# Patient Record
Sex: MALE | Race: AMERICAN INDIAN OR ALASKA NATIVE
[De-identification: names, ages, dates, MRNs, and addresses within clinical notes are randomized per-mention and may not be internally consistent; named-entity substitution may affect disease eponyms.]

---

## 2017-01-04 ENCOUNTER — PRIOR ORIGINAL RECORDS (OUTPATIENT)
Dept: OTHER | Age: 77
End: 2017-01-04

## 2017-01-11 ENCOUNTER — PRIOR ORIGINAL RECORDS (OUTPATIENT)
Dept: OTHER | Age: 77
End: 2017-01-11

## 2017-01-12 ENCOUNTER — PRIOR ORIGINAL RECORDS (OUTPATIENT)
Dept: OTHER | Age: 77
End: 2017-01-12

## 2017-02-10 ENCOUNTER — LAB REQUISITION (OUTPATIENT)
Dept: LAB | Facility: HOSPITAL | Age: 77
End: 2017-02-10
Payer: COMMERCIAL

## 2017-02-10 DIAGNOSIS — E78.00 PURE HYPERCHOLESTEROLEMIA: ICD-10-CM

## 2017-02-10 DIAGNOSIS — E11.9 TYPE 2 DIABETES MELLITUS WITHOUT COMPLICATIONS (HCC): ICD-10-CM

## 2017-02-10 LAB
ALT SERPL-CCNC: 23 U/L (ref 17–63)
ANION GAP SERPL CALC-SCNC: 12 MMOL/L (ref 0–18)
BASOPHILS # BLD: 0 K/UL (ref 0–0.2)
BASOPHILS NFR BLD: 1 %
BUN SERPL-MCNC: 15 MG/DL (ref 8–20)
BUN/CREAT SERPL: 13.6 (ref 10–20)
CALCIUM SERPL-MCNC: 9.3 MG/DL (ref 8.5–10.5)
CHLORIDE SERPL-SCNC: 105 MMOL/L (ref 95–110)
CHOLEST SERPL-MCNC: 119 MG/DL (ref 110–200)
CO2 SERPL-SCNC: 23 MMOL/L (ref 22–32)
CREAT SERPL-MCNC: 1.1 MG/DL (ref 0.5–1.5)
EOSINOPHIL # BLD: 0.4 K/UL (ref 0–0.7)
EOSINOPHIL NFR BLD: 6 %
ERYTHROCYTE [DISTWIDTH] IN BLOOD BY AUTOMATED COUNT: 14.8 % (ref 11–15)
GLUCOSE SERPL-MCNC: 130 MG/DL (ref 70–99)
HBA1C MFR BLD: 6 % (ref 4–6)
HCT VFR BLD AUTO: 32.2 % (ref 41–52)
HDLC SERPL-MCNC: 38 MG/DL
HGB BLD-MCNC: 10.6 G/DL (ref 13.5–17.5)
LDLC SERPL CALC-MCNC: 44 MG/DL (ref 0–99)
LYMPHOCYTES # BLD: 1.5 K/UL (ref 1–4)
LYMPHOCYTES NFR BLD: 21 %
MCH RBC QN AUTO: 30.2 PG (ref 27–32)
MCHC RBC AUTO-ENTMCNC: 33 G/DL (ref 32–37)
MCV RBC AUTO: 91.4 FL (ref 80–100)
MONOCYTES # BLD: 0.6 K/UL (ref 0–1)
MONOCYTES NFR BLD: 9 %
NEUTROPHILS # BLD AUTO: 4.4 K/UL (ref 1.8–7.7)
NEUTROPHILS NFR BLD: 64 %
NONHDLC SERPL-MCNC: 81 MG/DL
OSMOLALITY UR CALC.SUM OF ELEC: 293 MOSM/KG (ref 275–295)
PLATELET # BLD AUTO: 110 K/UL (ref 140–400)
PMV BLD AUTO: 8.2 FL (ref 7.4–10.3)
POTASSIUM SERPL-SCNC: 4 MMOL/L (ref 3.3–5.1)
RBC # BLD AUTO: 3.52 M/UL (ref 4.5–5.9)
SODIUM SERPL-SCNC: 140 MMOL/L (ref 136–144)
TRIGL SERPL-MCNC: 184 MG/DL (ref 1–149)
WBC # BLD AUTO: 7 K/UL (ref 4–11)

## 2017-02-10 PROCEDURE — 80061 LIPID PANEL: CPT | Performed by: INTERNAL MEDICINE

## 2017-02-10 PROCEDURE — 84460 ALANINE AMINO (ALT) (SGPT): CPT | Performed by: INTERNAL MEDICINE

## 2017-02-10 PROCEDURE — 85025 COMPLETE CBC W/AUTO DIFF WBC: CPT | Performed by: INTERNAL MEDICINE

## 2017-02-10 PROCEDURE — 80048 BASIC METABOLIC PNL TOTAL CA: CPT | Performed by: INTERNAL MEDICINE

## 2017-02-10 PROCEDURE — 83036 HEMOGLOBIN GLYCOSYLATED A1C: CPT | Performed by: INTERNAL MEDICINE

## 2017-02-23 ENCOUNTER — HOSPITAL ENCOUNTER (OUTPATIENT)
Dept: GENERAL RADIOLOGY | Age: 77
Discharge: HOME | End: 2017-02-23
Attending: ORTHOPAEDIC SURGERY
Payer: COMMERCIAL

## 2017-02-23 DIAGNOSIS — M25.571 ANKLE PAIN, RIGHT: ICD-10-CM

## 2017-02-23 DIAGNOSIS — M25.471 ANKLE SWELLING, RIGHT: ICD-10-CM

## 2017-02-23 PROCEDURE — 73610 X-RAY EXAM OF ANKLE: CPT

## 2017-05-05 ENCOUNTER — PRIOR ORIGINAL RECORDS (OUTPATIENT)
Dept: OTHER | Age: 77
End: 2017-05-05

## 2017-07-12 ENCOUNTER — PRIOR ORIGINAL RECORDS (OUTPATIENT)
Dept: OTHER | Age: 77
End: 2017-07-12

## 2017-07-13 LAB
ALT (SGPT): 26 U/L
BUN: 19 MG/DL
CALCIUM: 9.2 MG/DL
CHLORIDE: 110 MEQ/L
CHOLESTEROL, TOTAL: 118 MG/DL
CREATININE, SERUM: 1.17 MG/DL
GLUCOSE: 157 MG/DL
GLUCOSE: 157 MG/DL
HDL CHOLESTEROL: 42 MG/DL
LDL CHOLESTEROL: 51 MG/DL
POTASSIUM, SERUM: 4.1 MEQ/L
SGPT (ALT): 26 IU/L
SODIUM: 142 MEQ/L
TRIGLYCERIDES: 125 MG/DL

## 2017-08-31 PROBLEM — E11.51 DIABETES MELLITUS TYPE 2 WITH ATHEROSCLEROSIS OF ARTERIES OF EXTREMITIES (HCC): Status: ACTIVE | Noted: 2017-08-31

## 2017-08-31 PROBLEM — I70.209 DIABETES MELLITUS TYPE 2 WITH ATHEROSCLEROSIS OF ARTERIES OF EXTREMITIES (HCC): Status: ACTIVE | Noted: 2017-08-31

## 2017-08-31 PROBLEM — I73.9 ASYMPTOMATIC PVD (PERIPHERAL VASCULAR DISEASE) (HCC): Status: ACTIVE | Noted: 2017-08-31

## 2017-08-31 PROBLEM — I10 HYPERTENSION: Status: ACTIVE | Noted: 2017-08-31

## 2017-08-31 PROBLEM — E07.9 THYROID DISEASE: Status: ACTIVE | Noted: 2017-08-31

## 2017-08-31 PROBLEM — I25.10 CAD (CORONARY ARTERY DISEASE): Status: ACTIVE | Noted: 2017-08-31

## 2017-08-31 PROBLEM — E78.5 HYPERLIPIDEMIA: Status: ACTIVE | Noted: 2017-08-31

## 2017-09-12 ENCOUNTER — LAB REQUISITION (OUTPATIENT)
Dept: LAB | Facility: HOSPITAL | Age: 77
End: 2017-09-12
Payer: COMMERCIAL

## 2017-09-12 DIAGNOSIS — E11.9 TYPE 2 DIABETES MELLITUS WITHOUT COMPLICATIONS (HCC): ICD-10-CM

## 2017-09-12 DIAGNOSIS — E78.00 PURE HYPERCHOLESTEROLEMIA: ICD-10-CM

## 2017-09-12 LAB
ALT SERPL-CCNC: 17 U/L (ref 17–63)
ANION GAP SERPL CALC-SCNC: 6 MMOL/L (ref 0–18)
BASOPHILS # BLD: 0 K/UL (ref 0–0.2)
BASOPHILS NFR BLD: 1 %
BUN SERPL-MCNC: 21 MG/DL (ref 8–20)
BUN/CREAT SERPL: 15 (ref 10–20)
CALCIUM SERPL-MCNC: 9.1 MG/DL (ref 8.5–10.5)
CHLORIDE SERPL-SCNC: 109 MMOL/L (ref 95–110)
CHOLEST SERPL-MCNC: 140 MG/DL (ref 110–200)
CO2 SERPL-SCNC: 23 MMOL/L (ref 22–32)
CREAT SERPL-MCNC: 1.4 MG/DL (ref 0.5–1.5)
EOSINOPHIL # BLD: 0.3 K/UL (ref 0–0.7)
EOSINOPHIL NFR BLD: 5 %
ERYTHROCYTE [DISTWIDTH] IN BLOOD BY AUTOMATED COUNT: 16.3 % (ref 11–15)
GLUCOSE SERPL-MCNC: 178 MG/DL (ref 70–99)
HCT VFR BLD AUTO: 33.4 % (ref 41–52)
HDLC SERPL-MCNC: 46 MG/DL
HGB BLD-MCNC: 11.1 G/DL (ref 13.5–17.5)
LDLC SERPL CALC-MCNC: 62 MG/DL (ref 0–99)
LYMPHOCYTES # BLD: 1.4 K/UL (ref 1–4)
LYMPHOCYTES NFR BLD: 24 %
MCH RBC QN AUTO: 30.4 PG (ref 27–32)
MCHC RBC AUTO-ENTMCNC: 33.2 G/DL (ref 32–37)
MCV RBC AUTO: 91.8 FL (ref 80–100)
MONOCYTES # BLD: 0.5 K/UL (ref 0–1)
MONOCYTES NFR BLD: 7 %
NEUTROPHILS # BLD AUTO: 3.8 K/UL (ref 1.8–7.7)
NEUTROPHILS NFR BLD: 63 %
NONHDLC SERPL-MCNC: 94 MG/DL
OSMOLALITY UR CALC.SUM OF ELEC: 293 MOSM/KG (ref 275–295)
PLATELET # BLD AUTO: 116 K/UL (ref 140–400)
PMV BLD AUTO: 8.3 FL (ref 7.4–10.3)
POTASSIUM SERPL-SCNC: 4.7 MMOL/L (ref 3.3–5.1)
RBC # BLD AUTO: 3.64 M/UL (ref 4.5–5.9)
SODIUM SERPL-SCNC: 138 MMOL/L (ref 136–144)
TRIGL SERPL-MCNC: 159 MG/DL (ref 1–149)
TSH SERPL-ACNC: 0.3 UIU/ML (ref 0.45–5.33)
WBC # BLD AUTO: 6.1 K/UL (ref 4–11)

## 2017-09-12 PROCEDURE — 85025 COMPLETE CBC W/AUTO DIFF WBC: CPT | Performed by: INTERNAL MEDICINE

## 2017-09-12 PROCEDURE — 80048 BASIC METABOLIC PNL TOTAL CA: CPT | Performed by: INTERNAL MEDICINE

## 2017-09-12 PROCEDURE — 83036 HEMOGLOBIN GLYCOSYLATED A1C: CPT | Performed by: INTERNAL MEDICINE

## 2017-09-12 PROCEDURE — 80061 LIPID PANEL: CPT | Performed by: INTERNAL MEDICINE

## 2017-09-12 PROCEDURE — 84460 ALANINE AMINO (ALT) (SGPT): CPT | Performed by: INTERNAL MEDICINE

## 2017-09-12 PROCEDURE — 84443 ASSAY THYROID STIM HORMONE: CPT | Performed by: INTERNAL MEDICINE

## 2017-09-13 LAB — HBA1C MFR BLD: 6.7 % (ref 4–6)

## 2018-01-06 ENCOUNTER — APPOINTMENT (OUTPATIENT)
Dept: GENERAL RADIOLOGY | Facility: HOSPITAL | Age: 78
End: 2018-01-06
Attending: EMERGENCY MEDICINE
Payer: COMMERCIAL

## 2018-01-06 ENCOUNTER — HOSPITAL ENCOUNTER (EMERGENCY)
Facility: HOSPITAL | Age: 78
Discharge: HOME | End: 2018-01-06
Attending: EMERGENCY MEDICINE
Payer: COMMERCIAL

## 2018-01-06 VITALS
TEMPERATURE: 98 F | BODY MASS INDEX: 34 KG/M2 | DIASTOLIC BLOOD PRESSURE: 70 MMHG | RESPIRATION RATE: 20 BRPM | HEART RATE: 75 BPM | OXYGEN SATURATION: 96 % | SYSTOLIC BLOOD PRESSURE: 165 MMHG | WEIGHT: 249 LBS

## 2018-01-06 DIAGNOSIS — M77.50 TENDONITIS OF ANKLE: Primary | ICD-10-CM

## 2018-01-06 PROCEDURE — 73610 X-RAY EXAM OF ANKLE: CPT | Performed by: EMERGENCY MEDICINE

## 2018-01-06 PROCEDURE — 99283 EMERGENCY DEPT VISIT LOW MDM: CPT

## 2018-01-06 PROCEDURE — 73630 X-RAY EXAM OF FOOT: CPT | Performed by: EMERGENCY MEDICINE

## 2018-01-06 RX ORDER — HYDROCODONE BITARTRATE AND ACETAMINOPHEN 5; 325 MG/1; MG/1
1-2 TABLET ORAL EVERY 8 HOURS PRN
Qty: 14 TABLET | Refills: 0 | Status: SHIPPED | OUTPATIENT
Start: 2018-01-06 | End: 2018-01-13

## 2018-01-06 RX ORDER — HYDROCODONE BITARTRATE AND ACETAMINOPHEN 5; 325 MG/1; MG/1
1 TABLET ORAL ONCE
Status: COMPLETED | OUTPATIENT
Start: 2018-01-06 | End: 2018-01-06

## 2018-01-06 NOTE — ED INITIAL ASSESSMENT (HPI)
Pt c/o worsening right foot pain over the past couple days. Pt has hx of injury to the foot approx 20 years ago where he broke it in 2 places, pt wears a brace to the foot because it did not heal properly. Denies any recent injury or falls.  Pt c/o pain j

## 2018-01-07 NOTE — ED PROVIDER NOTES
Patient Seen in: [facility] AND [facility] Emergency Department    History   Patient presents with: Foot Pain    Stated Complaint: right foot pain/injury     HPI    HPI: Laquita Headings is a 76year old male who presents with pain to r ankle and foot.   Has history o Alcohol use: No               Comment: none      Review of Systems    Positive for stated complaint: right foot pain/injury   Other systems are as noted in HPI. Constitutional and vital signs reviewed.       All other systems reviewed fu with ortho.   Given crutches declines cast.  rec walking boot        Disposition and Plan     Clinical Impression:  Tendonitis of ankle  (primary encounter diagnosis)    Disposition:  Discharge    Follow-up:  MD Kelly Kim 110

## 2018-01-09 ENCOUNTER — LAB REQUISITION (OUTPATIENT)
Dept: LAB | Facility: HOSPITAL | Age: 78
End: 2018-01-09
Payer: COMMERCIAL

## 2018-01-09 ENCOUNTER — PRIOR ORIGINAL RECORDS (OUTPATIENT)
Dept: OTHER | Age: 78
End: 2018-01-09

## 2018-01-09 DIAGNOSIS — Z00.01 ENCOUNTER FOR GENERAL ADULT MEDICAL EXAMINATION WITH ABNORMAL FINDINGS: ICD-10-CM

## 2018-01-09 LAB
BILIRUB UR QL: NEGATIVE
CLARITY UR: CLEAR
COLOR UR: YELLOW
CREAT UR-MCNC: 164.2 MG/DL
GLUCOSE UR-MCNC: NEGATIVE MG/DL
HGB UR QL STRIP.AUTO: NEGATIVE
KETONES UR-MCNC: NEGATIVE MG/DL
LEUKOCYTE ESTERASE UR QL STRIP.AUTO: NEGATIVE
MICROALBUMIN UR-MCNC: 4.3 MG/DL (ref 0–1.8)
MICROALBUMIN/CREAT UR: 26.2 MG/G{CREAT} (ref 0–20)
NITRITE UR QL STRIP.AUTO: NEGATIVE
PH UR: 5 [PH] (ref 5–8)
PROT UR-MCNC: NEGATIVE MG/DL
SP GR UR STRIP: 1.02 (ref 1–1.03)
UROBILINOGEN UR STRIP-ACNC: <2
VIT C UR-MCNC: NEGATIVE MG/DL

## 2018-01-09 PROCEDURE — 81003 URINALYSIS AUTO W/O SCOPE: CPT | Performed by: INTERNAL MEDICINE

## 2018-01-09 PROCEDURE — 80061 LIPID PANEL: CPT | Performed by: INTERNAL MEDICINE

## 2018-01-09 PROCEDURE — 83036 HEMOGLOBIN GLYCOSYLATED A1C: CPT | Performed by: INTERNAL MEDICINE

## 2018-01-09 PROCEDURE — 84443 ASSAY THYROID STIM HORMONE: CPT | Performed by: INTERNAL MEDICINE

## 2018-01-09 PROCEDURE — 82306 VITAMIN D 25 HYDROXY: CPT | Performed by: INTERNAL MEDICINE

## 2018-01-09 PROCEDURE — 80053 COMPREHEN METABOLIC PANEL: CPT | Performed by: INTERNAL MEDICINE

## 2018-01-09 PROCEDURE — 85025 COMPLETE CBC W/AUTO DIFF WBC: CPT | Performed by: INTERNAL MEDICINE

## 2018-01-09 PROCEDURE — 82043 UR ALBUMIN QUANTITATIVE: CPT | Performed by: INTERNAL MEDICINE

## 2018-01-09 PROCEDURE — 82570 ASSAY OF URINE CREATININE: CPT | Performed by: INTERNAL MEDICINE

## 2018-01-09 PROCEDURE — 82607 VITAMIN B-12: CPT | Performed by: INTERNAL MEDICINE

## 2018-01-17 ENCOUNTER — PRIOR ORIGINAL RECORDS (OUTPATIENT)
Dept: OTHER | Age: 78
End: 2018-01-17

## 2018-04-17 ENCOUNTER — LAB REQUISITION (OUTPATIENT)
Dept: LAB | Facility: HOSPITAL | Age: 78
End: 2018-04-17
Payer: COMMERCIAL

## 2018-04-17 DIAGNOSIS — R10.11 RIGHT UPPER QUADRANT PAIN: ICD-10-CM

## 2018-04-17 PROCEDURE — 85025 COMPLETE CBC W/AUTO DIFF WBC: CPT | Performed by: INTERNAL MEDICINE

## 2018-04-17 PROCEDURE — 80053 COMPREHEN METABOLIC PANEL: CPT | Performed by: INTERNAL MEDICINE

## 2018-04-19 ENCOUNTER — HOSPITAL ENCOUNTER (OUTPATIENT)
Dept: ULTRASOUND IMAGING | Age: 78
Discharge: HOME | End: 2018-04-19
Attending: INTERNAL MEDICINE
Payer: COMMERCIAL

## 2018-04-19 DIAGNOSIS — R10.10 UPPER ABDOMINAL PAIN: ICD-10-CM

## 2018-04-19 PROCEDURE — 76705 ECHO EXAM OF ABDOMEN: CPT | Performed by: INTERNAL MEDICINE

## 2018-06-08 ENCOUNTER — LAB REQUISITION (OUTPATIENT)
Dept: LAB | Facility: HOSPITAL | Age: 78
End: 2018-06-08
Payer: COMMERCIAL

## 2018-06-08 DIAGNOSIS — N39.0 URINARY TRACT INFECTION: ICD-10-CM

## 2018-06-08 PROCEDURE — 87086 URINE CULTURE/COLONY COUNT: CPT | Performed by: INTERNAL MEDICINE

## 2018-06-08 PROCEDURE — 87077 CULTURE AEROBIC IDENTIFY: CPT | Performed by: INTERNAL MEDICINE

## 2018-06-14 ENCOUNTER — HOSPITAL ENCOUNTER (OUTPATIENT)
Dept: CT IMAGING | Facility: HOSPITAL | Age: 78
Discharge: HOME | End: 2018-06-14
Attending: RADIOLOGY
Payer: COMMERCIAL

## 2018-06-14 ENCOUNTER — HOSPITAL ENCOUNTER (OUTPATIENT)
Facility: HOSPITAL | Age: 78
Setting detail: OBSERVATION
Discharge: HOME | End: 2018-06-15
Attending: EMERGENCY MEDICINE | Admitting: INTERNAL MEDICINE
Payer: COMMERCIAL

## 2018-06-14 ENCOUNTER — APPOINTMENT (OUTPATIENT)
Dept: GENERAL RADIOLOGY | Facility: HOSPITAL | Age: 78
End: 2018-06-14
Attending: EMERGENCY MEDICINE
Payer: COMMERCIAL

## 2018-06-14 ENCOUNTER — PRIOR ORIGINAL RECORDS (OUTPATIENT)
Dept: OTHER | Age: 78
End: 2018-06-14

## 2018-06-14 DIAGNOSIS — I73.9 PAD (PERIPHERAL ARTERY DISEASE) (HCC): ICD-10-CM

## 2018-06-14 DIAGNOSIS — R07.9 ACUTE CHEST PAIN: Primary | ICD-10-CM

## 2018-06-14 DIAGNOSIS — K55.1 CHRONIC MESENTERIC ISCHEMIA (HCC): ICD-10-CM

## 2018-06-14 PROBLEM — D64.9 ANEMIA: Status: ACTIVE | Noted: 2018-06-14

## 2018-06-14 PROBLEM — R73.9 HYPERGLYCEMIA: Status: ACTIVE | Noted: 2018-06-14

## 2018-06-14 LAB
ANION GAP SERPL CALC-SCNC: 7 MMOL/L (ref 0–18)
BASOPHILS # BLD: 0.1 K/UL (ref 0–0.2)
BASOPHILS NFR BLD: 1 %
BUN SERPL-MCNC: 17 MG/DL (ref 8–20)
BUN/CREAT SERPL: 14.3 (ref 10–20)
CALCIUM SERPL-MCNC: 8.5 MG/DL (ref 8.5–10.5)
CHLORIDE SERPL-SCNC: 107 MMOL/L (ref 95–110)
CO2 SERPL-SCNC: 22 MMOL/L (ref 22–32)
CREAT BLD-MCNC: 1.1 MG/DL (ref 0.5–1.5)
CREAT SERPL-MCNC: 1.19 MG/DL (ref 0.5–1.5)
EOSINOPHIL # BLD: 0.3 K/UL (ref 0–0.7)
EOSINOPHIL NFR BLD: 4 %
ERYTHROCYTE [DISTWIDTH] IN BLOOD BY AUTOMATED COUNT: 15.2 % (ref 11–15)
GLUCOSE BLDC GLUCOMTR-MCNC: 124 MG/DL (ref 70–99)
GLUCOSE BLDC GLUCOMTR-MCNC: 184 MG/DL (ref 70–99)
GLUCOSE SERPL-MCNC: 118 MG/DL (ref 70–99)
HCT VFR BLD AUTO: 34 % (ref 41–52)
HGB BLD-MCNC: 11.8 G/DL (ref 13.5–17.5)
LYMPHOCYTES # BLD: 2 K/UL (ref 1–4)
LYMPHOCYTES NFR BLD: 29 %
MCH RBC QN AUTO: 33.2 PG (ref 27–32)
MCHC RBC AUTO-ENTMCNC: 34.8 G/DL (ref 32–37)
MCV RBC AUTO: 95.4 FL (ref 80–100)
MONOCYTES # BLD: 0.5 K/UL (ref 0–1)
MONOCYTES NFR BLD: 7 %
NEUTROPHILS # BLD AUTO: 4 K/UL (ref 1.8–7.7)
NEUTROPHILS NFR BLD: 58 %
OSMOLALITY UR CALC.SUM OF ELEC: 285 MOSM/KG (ref 275–295)
PLATELET # BLD AUTO: 128 K/UL (ref 140–400)
PMV BLD AUTO: 8.7 FL (ref 7.4–10.3)
POTASSIUM SERPL-SCNC: 4.4 MMOL/L (ref 3.3–5.1)
RBC # BLD AUTO: 3.57 M/UL (ref 4.5–5.9)
SODIUM SERPL-SCNC: 136 MMOL/L (ref 136–144)
TROPONIN I SERPL-MCNC: 0.01 NG/ML (ref ?–0.03)
WBC # BLD AUTO: 6.8 K/UL (ref 4–11)

## 2018-06-14 PROCEDURE — 85025 COMPLETE CBC W/AUTO DIFF WBC: CPT | Performed by: EMERGENCY MEDICINE

## 2018-06-14 PROCEDURE — 71045 X-RAY EXAM CHEST 1 VIEW: CPT | Performed by: EMERGENCY MEDICINE

## 2018-06-14 PROCEDURE — 36415 COLL VENOUS BLD VENIPUNCTURE: CPT

## 2018-06-14 PROCEDURE — 99285 EMERGENCY DEPT VISIT HI MDM: CPT

## 2018-06-14 PROCEDURE — 93010 ELECTROCARDIOGRAM REPORT: CPT | Performed by: EMERGENCY MEDICINE

## 2018-06-14 PROCEDURE — 75635 CT ANGIO ABDOMINAL ARTERIES: CPT | Performed by: RADIOLOGY

## 2018-06-14 PROCEDURE — 82565 ASSAY OF CREATININE: CPT

## 2018-06-14 PROCEDURE — 96372 THER/PROPH/DIAG INJ SC/IM: CPT

## 2018-06-14 PROCEDURE — 84484 ASSAY OF TROPONIN QUANT: CPT | Performed by: EMERGENCY MEDICINE

## 2018-06-14 PROCEDURE — 83036 HEMOGLOBIN GLYCOSYLATED A1C: CPT | Performed by: INTERNAL MEDICINE

## 2018-06-14 PROCEDURE — 93005 ELECTROCARDIOGRAM TRACING: CPT

## 2018-06-14 PROCEDURE — 80048 BASIC METABOLIC PNL TOTAL CA: CPT | Performed by: EMERGENCY MEDICINE

## 2018-06-14 PROCEDURE — 82962 GLUCOSE BLOOD TEST: CPT

## 2018-06-14 RX ORDER — ACETAMINOPHEN 325 MG/1
650 TABLET ORAL EVERY 6 HOURS PRN
Status: DISCONTINUED | OUTPATIENT
Start: 2018-06-14 | End: 2018-06-15

## 2018-06-14 RX ORDER — SODIUM CHLORIDE 0.9 % (FLUSH) 0.9 %
3 SYRINGE (ML) INJECTION AS NEEDED
Status: DISCONTINUED | OUTPATIENT
Start: 2018-06-14 | End: 2018-06-15

## 2018-06-14 RX ORDER — METOCLOPRAMIDE HYDROCHLORIDE 5 MG/ML
10 INJECTION INTRAMUSCULAR; INTRAVENOUS EVERY 8 HOURS PRN
Status: DISCONTINUED | OUTPATIENT
Start: 2018-06-14 | End: 2018-06-15

## 2018-06-14 RX ORDER — POLYETHYLENE GLYCOL 3350 17 G/17G
17 POWDER, FOR SOLUTION ORAL DAILY PRN
Status: DISCONTINUED | OUTPATIENT
Start: 2018-06-14 | End: 2018-06-15

## 2018-06-14 RX ORDER — ALFUZOSIN HYDROCHLORIDE 10 MG/1
10 TABLET, EXTENDED RELEASE ORAL
Status: DISCONTINUED | OUTPATIENT
Start: 2018-06-15 | End: 2018-06-15

## 2018-06-14 RX ORDER — ALFUZOSIN HYDROCHLORIDE 10 MG/1
10 TABLET, EXTENDED RELEASE ORAL DAILY
Qty: 1 TABLET | Refills: 0 | Status: SHIPPED | COMMUNITY
Start: 2018-06-14

## 2018-06-14 RX ORDER — HEPARIN SODIUM 5000 [USP'U]/ML
5000 INJECTION, SOLUTION INTRAVENOUS; SUBCUTANEOUS EVERY 12 HOURS SCHEDULED
Status: DISCONTINUED | OUTPATIENT
Start: 2018-06-14 | End: 2018-06-15

## 2018-06-14 RX ORDER — FINASTERIDE 5 MG/1
5 TABLET, FILM COATED ORAL DAILY
Status: DISCONTINUED | OUTPATIENT
Start: 2018-06-14 | End: 2018-06-15

## 2018-06-14 RX ORDER — ASCORBIC ACID 500 MG
500 TABLET ORAL DAILY
Status: DISCONTINUED | OUTPATIENT
Start: 2018-06-14 | End: 2018-06-15

## 2018-06-14 RX ORDER — CEPHALEXIN 500 MG/1
500 CAPSULE ORAL EVERY 8 HOURS SCHEDULED
Status: DISCONTINUED | OUTPATIENT
Start: 2018-06-14 | End: 2018-06-15

## 2018-06-14 RX ORDER — METOPROLOL SUCCINATE 25 MG/1
25 TABLET, EXTENDED RELEASE ORAL 2 TIMES DAILY
Status: DISCONTINUED | OUTPATIENT
Start: 2018-06-14 | End: 2018-06-15

## 2018-06-14 RX ORDER — DEXTROSE MONOHYDRATE 25 G/50ML
50 INJECTION, SOLUTION INTRAVENOUS AS NEEDED
Status: DISCONTINUED | OUTPATIENT
Start: 2018-06-14 | End: 2018-06-15

## 2018-06-14 RX ORDER — SODIUM PHOSPHATE, DIBASIC AND SODIUM PHOSPHATE, MONOBASIC 7; 19 G/133ML; G/133ML
1 ENEMA RECTAL ONCE AS NEEDED
Status: DISCONTINUED | OUTPATIENT
Start: 2018-06-14 | End: 2018-06-15

## 2018-06-14 RX ORDER — BISACODYL 10 MG
10 SUPPOSITORY, RECTAL RECTAL
Status: DISCONTINUED | OUTPATIENT
Start: 2018-06-14 | End: 2018-06-15

## 2018-06-14 RX ORDER — ROSUVASTATIN CALCIUM 5 MG/1
5 TABLET, COATED ORAL NIGHTLY
COMMUNITY

## 2018-06-14 RX ORDER — DOCUSATE SODIUM 100 MG/1
100 CAPSULE, LIQUID FILLED ORAL 2 TIMES DAILY
Status: DISCONTINUED | OUTPATIENT
Start: 2018-06-14 | End: 2018-06-15

## 2018-06-14 RX ORDER — CEPHALEXIN 500 MG/1
500 CAPSULE ORAL ONCE
Status: COMPLETED | OUTPATIENT
Start: 2018-06-14 | End: 2018-06-14

## 2018-06-14 RX ORDER — ONDANSETRON 2 MG/ML
4 INJECTION INTRAMUSCULAR; INTRAVENOUS EVERY 6 HOURS PRN
Status: DISCONTINUED | OUTPATIENT
Start: 2018-06-14 | End: 2018-06-15

## 2018-06-14 RX ORDER — ROSUVASTATIN CALCIUM 10 MG/1
5 TABLET, COATED ORAL NIGHTLY
Status: DISCONTINUED | OUTPATIENT
Start: 2018-06-14 | End: 2018-06-15

## 2018-06-14 NOTE — ED PROVIDER NOTES
Patient Seen in: [facility] AND [facility] Emergency Department    History   Patient presents with:  Chest Pain Angina (cardiovascular)    Stated Complaint: chest pain    HPI    Patient presents emergency department complaining of 2 hours of left-sided chest dis heard.  Pulmonary/Chest: Effort normal and breath sounds normal. No respiratory distress. Abdominal: Soft. He exhibits no distension. There is no tenderness. Musculoskeletal: Normal range of motion. He exhibits no tenderness.    Neurological: He is aler Extensive atherosclerotic calcification seen within the aortobiiliac system and throughout the bilateral lower extremities. Occluded bilateral peroneal arteries.   Occluded distal bilateral anterior tibial arteries with distal reconstitution of the dorsali pain  (primary encounter diagnosis)    Disposition:  Admit  6/14/2018  3:33 pm    Follow-up:  No follow-up provider specified.   We recommend that you schedule follow up care with a primary care provider within the next three months to obtain basic health s

## 2018-06-14 NOTE — CONSULTS
Cec[facility] NOTE  Cardiology Consultation    Sawyer Dec Patient Status:  Emergency    1940 MRN [id number]   Location [address] Attending Evon Daniel MD   Hosp Day # 0 PCP MD Elza Lozoya Anemia     Hyperglycemia          Assessment and Plan:  #1) chest pain atypical possibly musculoskeletal no objective evidence for ischemia will proceed with a 2D echo with Doppler and Lexiscan.   2 severe peripheral vascular disease including mesenteric ar cephALEXin (KEFLEX) cap 500 mg, 500 mg, Oral, Once    Review of Systems:    Review of systems  General no chills fevers.   Head no headaches  ENT no sinusitis   Chest no cough no hemoptysis  GI no hematemesis no melena   no dysuria hematuria   hematologic

## 2018-06-14 NOTE — PLAN OF CARE
Problem: Patient Centered Care  Goal: Patient preferences are identified and integrated in the patient's plan of care  Interventions:  - What would you like us to know as we care for you?  Keep my wife informed she is an RN  - Provide timely, complete, and medications as ordered  - Initiate emergency measures for life threatening arrhythmias  - Monitor electrolytes and administer replacement therapy as ordered  Outcome: Progressing      Comments: Vss, no complaints, wife at the bedside, plan for echo and str

## 2018-06-14 NOTE — HISTORICAL OFFICE NOTE
Charmayne Devoid  : 1940  ACCOUNT:  [id number]  [phone]  PCP:      TODAY'S DATE: 2018  DICTATED BY:  [Dr. Trinity Art: [Followup of CAD, of native vessels, Followup of Hypercholesteremia, pure and Followup of Hypertension.] no trauma and normocephalic. EYES: conjunctivae not injected and no xanthelasma. ENT: mucosa pink and moist. NECK: jugular venous pressure not elevated. RESP: respirations with normal rate and rhythm, clear to auscultation.  GI: no masses, tenderness or hep HCl         500MG     1 tab twice daily                        01/04/17 Vitamin D ([id number]  weekly                                   06/29/16 Aspirin               325MG     1 tablet daily                           01/27/16 Levothyroxine Sodium

## 2018-06-14 NOTE — ED INITIAL ASSESSMENT (HPI)
Patient here for c/o chest pain x 45min. Patient seen here earlier today for CT. Took 4 baby asa pta.

## 2018-06-15 ENCOUNTER — APPOINTMENT (OUTPATIENT)
Dept: CV DIAGNOSTICS | Facility: HOSPITAL | Age: 78
End: 2018-06-15
Attending: INTERNAL MEDICINE
Payer: COMMERCIAL

## 2018-06-15 ENCOUNTER — APPOINTMENT (OUTPATIENT)
Dept: NUCLEAR MEDICINE | Facility: HOSPITAL | Age: 78
End: 2018-06-15
Attending: INTERNAL MEDICINE
Payer: COMMERCIAL

## 2018-06-15 VITALS
SYSTOLIC BLOOD PRESSURE: 152 MMHG | OXYGEN SATURATION: 96 % | WEIGHT: 248.88 LBS | BODY MASS INDEX: 34 KG/M2 | RESPIRATION RATE: 18 BRPM | DIASTOLIC BLOOD PRESSURE: 63 MMHG | TEMPERATURE: 97 F | HEART RATE: 81 BPM

## 2018-06-15 LAB
GLUCOSE BLDC GLUCOMTR-MCNC: 150 MG/DL (ref 70–99)
HBA1C MFR BLD: 6.7 % (ref 4–6)

## 2018-06-15 PROCEDURE — 96372 THER/PROPH/DIAG INJ SC/IM: CPT

## 2018-06-15 PROCEDURE — 82962 GLUCOSE BLOOD TEST: CPT

## 2018-06-15 PROCEDURE — 78452 HT MUSCLE IMAGE SPECT MULT: CPT | Performed by: INTERNAL MEDICINE

## 2018-06-15 PROCEDURE — 93306 TTE W/DOPPLER COMPLETE: CPT | Performed by: INTERNAL MEDICINE

## 2018-06-15 PROCEDURE — 93017 CV STRESS TEST TRACING ONLY: CPT | Performed by: INTERNAL MEDICINE

## 2018-06-15 PROCEDURE — 93016 CV STRESS TEST SUPVJ ONLY: CPT | Performed by: INTERNAL MEDICINE

## 2018-06-15 PROCEDURE — 93018 CV STRESS TEST I&R ONLY: CPT | Performed by: INTERNAL MEDICINE

## 2018-06-15 PROCEDURE — A4216 STERILE WATER/SALINE, 10 ML: HCPCS

## 2018-06-15 RX ORDER — 0.9 % SODIUM CHLORIDE 0.9 %
VIAL (ML) INJECTION
Status: COMPLETED
Start: 2018-06-15 | End: 2018-06-15

## 2018-06-15 NOTE — RESPIRATORY THERAPY NOTE
SARAH ASSESSMENT:    Pt does not have a previous diagnosis of SARAH. Pt does not routinely use a CPAP device at home. patient refused CPAP

## 2018-06-15 NOTE — PROGRESS NOTES
[facility]    Progress Note    Delmar Pedro Patient Status:  Observation    1940 MRN [id number]   Location [address] [facility] Attending Johanna Dey MD   Hosp Day # 0 PCP Heena May MD     Subjective:     Respiratory: Negat reconstitution of the dorsalis pedis artery. High-grade narrowing and atherosclerotic calcification of the distal right common femoral artery and into the origin of the right superficial femoral artery. No focal vascular cutoff or aneurysmal dilation.   E

## 2018-06-15 NOTE — PROGRESS NOTES
[facility]    Progress Note    Martha Catherine Patient Status:  Observation    1940 MRN [id number]   Location [address] [facility] Attending Colin Rodarte MD   Hosp Day # 0 PCP Adriana Wong MD       Subjective:   Martha Catherine is a(n) 7 Calcium  5 mg Oral Nightly   • Vitamin C  500 mg Oral Daily   • Heparin Sodium (Porcine)  5,000 Units Subcutaneous 2 times per day   • docusate sodium  100 mg Oral BID   • cephALEXin  500 mg Oral Q8H [address]   • Insulin Aspart Pen  1-5 Units Subcutaneous TID C (CST): Jeff Saucedo MD on 6/14/2018 at 15:12          Ekg 12-lead    Result Date: 6/14/2018  ECG Report  Interpretation  -------------------------- Sinus Rhythm NORMAL When compared with ECG of 01/12/2016 14:46:05 No significant changes have occurred Elect

## 2018-06-15 NOTE — PLAN OF CARE
Problem: Patient Centered Care  Goal: Patient preferences are identified and integrated in the patient's plan of care  Interventions:  - What would you like us to know as we care for you?  Keep my wife informed she is an RN  - Provide timely, complete, and medications as ordered  - Initiate emergency measures for life threatening arrhythmias  - Monitor electrolytes and administer replacement therapy as ordered   Outcome: Progressing      Problem: RESPIRATORY - ADULT  Goal: Achieves optimal ventilation and ox

## 2018-06-15 NOTE — TRANSITION NOTE
[facility] HOSP - [facility]    Cardiology Discharge Summary    Ele Whalen Patient Status:  Observation    1940 MRN [id number]   Location [address] [place] [address] Attending Callum Mcdonough MD   Hosp Day # 0 PCP Thang Ochoa MD       Subjective:   Baldo Gum MD     -------------------------------------------------------------------  Study Conclusions  1. Left ventricle: The cavity size was normal. Wall thickness was     increased in a pattern of mild LVH.  Systolic function was     normal. The estimated ejectio 06/14/2018   B12 432 01/09/2018       JACOBO Peguero  6/15/2018

## 2018-06-26 NOTE — PROGRESS NOTES
[facility] 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 This lesion would be impenetrable for stenting.   The patient also has heavily calcified aortic plaque

## 2018-07-02 PROCEDURE — 86038 ANTINUCLEAR ANTIBODIES: CPT | Performed by: INTERNAL MEDICINE

## 2018-07-02 PROCEDURE — 87340 HEPATITIS B SURFACE AG IA: CPT | Performed by: INTERNAL MEDICINE

## 2018-07-02 PROCEDURE — 86803 HEPATITIS C AB TEST: CPT | Performed by: INTERNAL MEDICINE

## 2018-07-17 ENCOUNTER — ANESTHESIA (OUTPATIENT)
Dept: ENDOSCOPY | Facility: HOSPITAL | Age: 78
End: 2018-07-17
Payer: COMMERCIAL

## 2018-07-17 ENCOUNTER — HOSPITAL ENCOUNTER (OUTPATIENT)
Facility: HOSPITAL | Age: 78
Setting detail: HOSPITAL OUTPATIENT SURGERY
Discharge: HOME | End: 2018-07-17
Attending: INTERNAL MEDICINE | Admitting: INTERNAL MEDICINE
Payer: COMMERCIAL

## 2018-07-17 ENCOUNTER — ANESTHESIA EVENT (OUTPATIENT)
Dept: ENDOSCOPY | Facility: HOSPITAL | Age: 78
End: 2018-07-17
Payer: COMMERCIAL

## 2018-07-17 ENCOUNTER — SURGERY (OUTPATIENT)
Age: 78
End: 2018-07-17

## 2018-07-17 DIAGNOSIS — R93.3 ABNORMAL CT SCAN, COLON: ICD-10-CM

## 2018-07-17 DIAGNOSIS — R10.13 EPIGASTRIC PAIN: ICD-10-CM

## 2018-07-17 LAB — GLUCOSE BLDC GLUCOMTR-MCNC: 108 MG/DL (ref 70–99)

## 2018-07-17 PROCEDURE — 82962 GLUCOSE BLOOD TEST: CPT

## 2018-07-17 PROCEDURE — 88312 SPECIAL STAINS GROUP 1: CPT | Performed by: INTERNAL MEDICINE

## 2018-07-17 PROCEDURE — 0DJD8ZZ INSPECTION OF LOWER INTESTINAL TRACT, VIA NATURAL OR ARTIFICIAL OPENING ENDOSCOPIC: ICD-10-PCS | Performed by: INTERNAL MEDICINE

## 2018-07-17 PROCEDURE — 0DB68ZX EXCISION OF STOMACH, VIA NATURAL OR ARTIFICIAL OPENING ENDOSCOPIC, DIAGNOSTIC: ICD-10-PCS | Performed by: INTERNAL MEDICINE

## 2018-07-17 PROCEDURE — 88305 TISSUE EXAM BY PATHOLOGIST: CPT | Performed by: INTERNAL MEDICINE

## 2018-07-17 RX ORDER — LIDOCAINE HYDROCHLORIDE 10 MG/ML
INJECTION, SOLUTION EPIDURAL; INFILTRATION; INTRACAUDAL; PERINEURAL AS NEEDED
Status: DISCONTINUED | OUTPATIENT
Start: 2018-07-17 | End: 2018-07-17 | Stop reason: SURG

## 2018-07-17 RX ORDER — MIDAZOLAM HYDROCHLORIDE 1 MG/ML
INJECTION INTRAMUSCULAR; INTRAVENOUS AS NEEDED
Status: DISCONTINUED | OUTPATIENT
Start: 2018-07-17 | End: 2018-07-17 | Stop reason: SURG

## 2018-07-17 RX ORDER — DEXTROSE MONOHYDRATE 25 G/50ML
50 INJECTION, SOLUTION INTRAVENOUS
Status: CANCELLED | OUTPATIENT
Start: 2018-07-17

## 2018-07-17 RX ORDER — SODIUM CHLORIDE, SODIUM LACTATE, POTASSIUM CHLORIDE, CALCIUM CHLORIDE 600; 310; 30; 20 MG/100ML; MG/100ML; MG/100ML; MG/100ML
INJECTION, SOLUTION INTRAVENOUS CONTINUOUS
Status: CANCELLED | OUTPATIENT
Start: 2018-07-17

## 2018-07-17 RX ORDER — NALOXONE HYDROCHLORIDE 0.4 MG/ML
80 INJECTION, SOLUTION INTRAMUSCULAR; INTRAVENOUS; SUBCUTANEOUS AS NEEDED
Status: CANCELLED | OUTPATIENT
Start: 2018-07-17 | End: 2018-07-17

## 2018-07-17 RX ORDER — MIDAZOLAM HYDROCHLORIDE 1 MG/ML
1 INJECTION INTRAMUSCULAR; INTRAVENOUS EVERY 5 MIN PRN
Status: DISCONTINUED | OUTPATIENT
Start: 2018-07-17 | End: 2018-07-17

## 2018-07-17 RX ORDER — SODIUM CHLORIDE 0.9 % (FLUSH) 0.9 %
10 SYRINGE (ML) INJECTION AS NEEDED
Status: CANCELLED | OUTPATIENT
Start: 2018-07-17

## 2018-07-17 RX ORDER — SODIUM CHLORIDE, SODIUM LACTATE, POTASSIUM CHLORIDE, CALCIUM CHLORIDE 600; 310; 30; 20 MG/100ML; MG/100ML; MG/100ML; MG/100ML
INJECTION, SOLUTION INTRAVENOUS CONTINUOUS
Status: DISCONTINUED | OUTPATIENT
Start: 2018-07-17 | End: 2018-07-17

## 2018-07-17 RX ADMIN — SODIUM CHLORIDE, SODIUM LACTATE, POTASSIUM CHLORIDE, CALCIUM CHLORIDE: 600; 310; 30; 20 INJECTION, SOLUTION INTRAVENOUS at 09:11:00

## 2018-07-17 RX ADMIN — MIDAZOLAM HYDROCHLORIDE 2 MG: 1 INJECTION INTRAMUSCULAR; INTRAVENOUS at 09:14:00

## 2018-07-17 RX ADMIN — SODIUM CHLORIDE, SODIUM LACTATE, POTASSIUM CHLORIDE, CALCIUM CHLORIDE: 600; 310; 30; 20 INJECTION, SOLUTION INTRAVENOUS at 09:30:00

## 2018-07-17 RX ADMIN — LIDOCAINE HYDROCHLORIDE 50 MG: 10 INJECTION, SOLUTION EPIDURAL; INFILTRATION; INTRACAUDAL; PERINEURAL at 09:14:00

## 2018-07-17 NOTE — ANESTHESIA POSTPROCEDURE EVALUATION
Patient: Margaret Poole    Procedure Summary     Date:  07/17/18 Room / Location:  [address] [place] [address] ENDOSCOPY 01 / [address] [place] [address] ENDOSCOPY    Anesthesia Start:  9639 Anesthesia Stop:  7005    Procedures:       COLONOSCOPY (N/A )      ESOPHAGOGASTRODUODENOSCOPY (EGD) (N/A ) Diagnosis

## 2018-07-17 NOTE — OPERATIVE REPORT
COLONOSCOPY AND ESOPHAGOGASTRODUODENOSCOPY REPORT    Patient Name:  Lin Turcios  Medical Record #: [id number]  YOB: 1940  Date of Procedure: 7/17/2018    Referring physician: Sumanth Morse MD    Surgeon:  Leti Thompson.  Silas Cervantes MD    Pre-op di throughout the greater curvature/body/antrum. Photographs and biopsies were taken. This was not a typical appearance of portal hypertensive gastropathy. There were no varices or vascular anomalies.    The duodenum was normal with no erosions and with a n

## 2018-07-17 NOTE — ANESTHESIA PREPROCEDURE EVALUATION
Anesthesia PreOp Note    HPI:     Sawyer Pisano is a 76year old male who presents for preoperative consultation requested by: Winnie Fierro MD    Date of Surgery: 7/17/2018    Procedure(s):  COLONOSCOPY  ESOPHAGOGASTRODUODENOSCOPY (EGD)  Indication: Epig Metoprolol Succinate ER 25 MG Oral Tablet 24 Hr Take 25 mg by mouth 2 (two) times daily. Disp:  Rfl:  Taking   finasteride 5 MG Oral Tab Take 5 mg by mouth daily. Disp:  Rfl:  Taking   Vitamin C (VITAMIN C) 500 MG Oral Tab Take 500 mg by mouth daily.  D summary reviewed and Nursing notes reviewed    Airway   Mallampati: III  Dental      Pulmonary     breath sounds clear to auscultation  (+) shortness of breath,   Cardiovascular   (+) hypertension, CAD,     Rhythm: regular  Rate: normal  ROS comment: PVD

## 2018-07-17 NOTE — H&P
PRE-PROCEDURE UPDATE    HPI: Natasha Mendez is a 76year old male. 12/23/1940. Patient presents for a colonoscopy and gastroscopy. ALLERGIES:   Actos [Pioglitazone]    PAIN  Metformin               PAIN      No current outpatient prescriptions on file.

## 2018-07-18 VITALS
BODY MASS INDEX: 32.73 KG/M2 | SYSTOLIC BLOOD PRESSURE: 127 MMHG | DIASTOLIC BLOOD PRESSURE: 67 MMHG | HEART RATE: 70 BPM | RESPIRATION RATE: 16 BRPM | WEIGHT: 241.63 LBS | HEIGHT: 72 IN | OXYGEN SATURATION: 98 %

## 2018-07-20 PROBLEM — I70.0 AORTIC ATHEROSCLEROSIS (HCC): Status: ACTIVE | Noted: 2018-07-20

## 2018-07-20 PROBLEM — I70.8 CELIAC ARTERY ATHEROSCLEROSIS: Status: ACTIVE | Noted: 2018-07-20

## 2018-07-20 PROBLEM — I70.219 ATHEROSCLEROSIS OF ARTERY OF EXTREMITY WITH INTERMITTENT CLAUDICATION (HCC): Status: ACTIVE | Noted: 2018-07-20

## 2018-07-20 PROBLEM — K55.1 SUPERIOR MESENTERIC ARTERY ATHEROSCLEROSIS (HCC): Status: ACTIVE | Noted: 2018-07-20

## 2018-07-24 LAB
BUN: 17 MG/DL
CALCIUM: 8.5 MG/DL
CHLORIDE: 107 MEQ/L
CHOLESTEROL, TOTAL: 159 MG/DL
CREATININE, SERUM: 1.19 MG/DL
GLUCOSE: 118 MG/DL
HDL CHOLESTEROL: 38 MG/DL
HEMOGLOBIN A1C: 6.7 %
LDL CHOLESTEROL: 87 MG/DL
NON-HDL CHOLESTEROL: 121 MG/DL
POTASSIUM, SERUM: 4.4 MEQ/L
SODIUM: 136 MEQ/L
TRIGLYCERIDES: 171 MG/DL

## 2018-07-25 ENCOUNTER — PRIOR ORIGINAL RECORDS (OUTPATIENT)
Dept: OTHER | Age: 78
End: 2018-07-25

## 2018-08-13 ENCOUNTER — OFFICE VISIT (OUTPATIENT)
Dept: ENDOCRINOLOGY CLINIC | Facility: CLINIC | Age: 78
End: 2018-08-13
Payer: COMMERCIAL

## 2018-08-13 VITALS
SYSTOLIC BLOOD PRESSURE: 150 MMHG | HEIGHT: 72 IN | WEIGHT: 253 LBS | DIASTOLIC BLOOD PRESSURE: 70 MMHG | HEART RATE: 73 BPM | BODY MASS INDEX: 34.27 KG/M2

## 2018-08-13 DIAGNOSIS — E06.3 HYPOTHYROIDISM DUE TO HASHIMOTO'S THYROIDITIS: ICD-10-CM

## 2018-08-13 DIAGNOSIS — E03.8 HYPOTHYROIDISM DUE TO HASHIMOTO'S THYROIDITIS: ICD-10-CM

## 2018-08-13 DIAGNOSIS — Z79.4 UNCONTROLLED TYPE 2 DIABETES MELLITUS WITH HYPERGLYCEMIA, WITH LONG-TERM CURRENT USE OF INSULIN (HCC): Primary | ICD-10-CM

## 2018-08-13 DIAGNOSIS — E11.65 UNCONTROLLED TYPE 2 DIABETES MELLITUS WITH HYPERGLYCEMIA, WITH LONG-TERM CURRENT USE OF INSULIN (HCC): Primary | ICD-10-CM

## 2018-08-13 LAB
CARTRIDGE LOT#: ABNORMAL NUMERIC
GLUCOSE BLOOD: 172
HEMOGLOBIN A1C: 6.5 % (ref 4.3–5.6)
TEST STRIP LOT #: NORMAL NUMERIC

## 2018-08-13 PROCEDURE — 99212 OFFICE O/P EST SF 10 MIN: CPT | Performed by: INTERNAL MEDICINE

## 2018-08-13 PROCEDURE — 36416 COLLJ CAPILLARY BLOOD SPEC: CPT | Performed by: INTERNAL MEDICINE

## 2018-08-13 PROCEDURE — 83036 HEMOGLOBIN GLYCOSYLATED A1C: CPT | Performed by: INTERNAL MEDICINE

## 2018-08-13 PROCEDURE — 99204 OFFICE O/P NEW MOD 45 MIN: CPT | Performed by: INTERNAL MEDICINE

## 2018-08-13 PROCEDURE — 82962 GLUCOSE BLOOD TEST: CPT | Performed by: INTERNAL MEDICINE

## 2018-08-13 RX ORDER — ASPIRIN 325 MG
325 TABLET ORAL NIGHTLY
COMMUNITY

## 2018-08-13 NOTE — PATIENT INSTRUCTIONS
Dr. Asim Connelly or Dr. Abimael Magana in [facility]     Stop Lantus    Start Jardiance 25mg daily    Call with blood glucose levels in 1-2 weeks

## 2018-08-13 NOTE — PROGRESS NOTES
Name: Emiliano Alvarado  Date: 8/13/2018    Referring Physician: No ref. provider found    HISTORY OF PRESENT ILLNESS   Emiliano Alvarado is a 76year old male who presents for diabetes mellitus, diagnosed over 10 years ago.      He developed significant abdominal pain a Rfl:   •  Alfuzosin HCl ER 10 MG Oral Tablet 24 Hr, Take 1 tablet (10 mg total) by mouth daily. , Disp: 1 tablet, Rfl: 0  •  Levothyroxine Sodium (SYNTHROID) 125 MCG Oral Tab, Take 125 mcg by mouth before breakfast.  , Disp: , Rfl:   •  ergocalciferol 87474 mesenteric artery atherosclerosis ([facility] [address] 7/20/2018   • Thyroid disease     Hypothyroid       Surgical history:   Past Surgical History:  No date: CABG      Comment: 2014  No date: COLON SURGERY  07/2018: COLONOSCOPY  7/17/2018: COLONOSCOPY N/A      Comment: of follow up with podiatry because he is experiencing bleeding with toe nail trim   -Normotensive    RTC 3 months    8/13/2018  Tomás Pirse MD

## 2018-08-14 ENCOUNTER — TELEPHONE (OUTPATIENT)
Dept: ENDOCRINOLOGY CLINIC | Facility: CLINIC | Age: 78
End: 2018-08-14

## 2018-08-15 NOTE — TELEPHONE ENCOUNTER
Second faxed denial received with the same reason for denial. They did not change decision based on additional information submitted. Will attempted appeal. Before that can be submitted pt will need to sign an authorized representative form.      Called the

## 2018-08-15 NOTE — TELEPHONE ENCOUNTER
Fax received from insurance that PA for jardiance has been denied. Patient's A1C needs to be >7.0% for them to approve coverage. Current A1C is 6.5%. Dr. Alfonso Matson would you like to prescribe an alternative?

## 2018-08-15 NOTE — TELEPHONE ENCOUNTER
Per RICARDO gallegos with additional information submitted today by Kimberly Rivero RN has also been denied. Additional information should be received on denial fax. Will await response.

## 2018-08-15 NOTE — TELEPHONE ENCOUNTER
He was on insulin therapy and I'm talking off insulin and switching to [place], can we provide more info?

## 2018-08-15 NOTE — TELEPHONE ENCOUNTER
Patient signed representative form. Generated appeal letter. Placed on Dr. Mary Del Toro desk for signature.

## 2018-08-16 NOTE — TELEPHONE ENCOUNTER
See below. Appeal received and they are wanting to set up peer to peer for Jardiance denial. Would you want to pursue this option?

## 2018-08-16 NOTE — TELEPHONE ENCOUNTER
Ok to set up Peer to peer but on the denial was there another option that was covered or alternatives.

## 2018-08-16 NOTE — TELEPHONE ENCOUNTER
Please call Layne MADRID/ERIBERTO re: appeal for jardiance. She would like to know if Dr. Angel Cardenas will be available for peer to peer within next 2 days.

## 2018-08-17 NOTE — TELEPHONE ENCOUNTER
Returned call to plan. Because the appeal was submitted as urgent the reviewer would need to call for peer to peer either today or tomorrow. If provider not available to answer would be denied. Discussed with SH.  Other option is to remove urgency and inste

## 2018-09-28 ENCOUNTER — HOSPITAL ENCOUNTER (OUTPATIENT)
Dept: ULTRASOUND IMAGING | Facility: HOSPITAL | Age: 78
Discharge: HOME | End: 2018-09-28
Attending: INTERNAL MEDICINE
Payer: COMMERCIAL

## 2018-09-28 ENCOUNTER — PRIOR ORIGINAL RECORDS (OUTPATIENT)
Dept: OTHER | Age: 78
End: 2018-09-28

## 2018-09-28 DIAGNOSIS — R60.0 LOCALIZED EDEMA: ICD-10-CM

## 2018-09-28 PROCEDURE — 93971 EXTREMITY STUDY: CPT | Performed by: INTERNAL MEDICINE

## 2018-10-08 ENCOUNTER — TELEPHONE (OUTPATIENT)
Dept: ENDOCRINOLOGY CLINIC | Facility: CLINIC | Age: 78
End: 2018-10-08

## 2018-10-08 NOTE — TELEPHONE ENCOUNTER
Lynsey Wilson was sent to [place] doesn't appear it was sent to [email or web address] yet. Sent prescription and did inform wife.

## 2018-10-08 NOTE — TELEPHONE ENCOUNTER
Wife requesting to have Rx jardiance sent to [facility] mail order pharmacy . Wife states medication was too expensive through [address]. Wife states [facility] mail order requesting additional information calling to check status.  Please call th

## 2018-11-12 ENCOUNTER — OFFICE VISIT (OUTPATIENT)
Dept: ENDOCRINOLOGY CLINIC | Facility: CLINIC | Age: 78
End: 2018-11-12
Payer: COMMERCIAL

## 2018-11-12 ENCOUNTER — PRIOR ORIGINAL RECORDS (OUTPATIENT)
Dept: OTHER | Age: 78
End: 2018-11-12

## 2018-11-12 ENCOUNTER — APPOINTMENT (OUTPATIENT)
Dept: LAB | Facility: HOSPITAL | Age: 78
End: 2018-11-12
Attending: INTERNAL MEDICINE
Payer: COMMERCIAL

## 2018-11-12 VITALS
HEART RATE: 61 BPM | SYSTOLIC BLOOD PRESSURE: 142 MMHG | BODY MASS INDEX: 34 KG/M2 | WEIGHT: 249.19 LBS | DIASTOLIC BLOOD PRESSURE: 68 MMHG

## 2018-11-12 DIAGNOSIS — E11.51 CONTROLLED TYPE 2 DIABETES MELLITUS WITH DIABETIC PERIPHERAL ANGIOPATHY WITHOUT GANGRENE, WITHOUT LONG-TERM CURRENT USE OF INSULIN (HCC): Primary | ICD-10-CM

## 2018-11-12 DIAGNOSIS — E03.8 HYPOTHYROIDISM DUE TO HASHIMOTO'S THYROIDITIS: ICD-10-CM

## 2018-11-12 DIAGNOSIS — E06.3 HYPOTHYROIDISM DUE TO HASHIMOTO'S THYROIDITIS: ICD-10-CM

## 2018-11-12 PROCEDURE — 36415 COLL VENOUS BLD VENIPUNCTURE: CPT

## 2018-11-12 PROCEDURE — 84439 ASSAY OF FREE THYROXINE: CPT

## 2018-11-12 PROCEDURE — 36416 COLLJ CAPILLARY BLOOD SPEC: CPT | Performed by: INTERNAL MEDICINE

## 2018-11-12 PROCEDURE — 99212 OFFICE O/P EST SF 10 MIN: CPT | Performed by: INTERNAL MEDICINE

## 2018-11-12 PROCEDURE — 99213 OFFICE O/P EST LOW 20 MIN: CPT | Performed by: INTERNAL MEDICINE

## 2018-11-12 PROCEDURE — 82962 GLUCOSE BLOOD TEST: CPT | Performed by: INTERNAL MEDICINE

## 2018-11-12 PROCEDURE — 84443 ASSAY THYROID STIM HORMONE: CPT

## 2018-11-12 NOTE — PROGRESS NOTES
Name: Donavan Figueroa  Date: 11/12/2018    Referring Physician: No ref. provider found    HISTORY OF PRESENT ILLNESS   Donavan Figueroa is a 76year old male who presents for diabetes mellitus, diagnosed over 10 years ago.      He developed significant abdominal pain (SYNTHROID) 125 MCG Oral Tab, Take 125 mcg by mouth before breakfast.  , Disp: , Rfl:   •  ergocalciferol 25871 units Oral Cap, 50,000 Units once a week.  , Disp: , Rfl:   •  Ferrous Sulfate (IRON) 325 (65 Fe) MG Oral Tab, 1 tablet daily, Disp: , Rfl: 0  • Concern: Not Asked        Stress Concern: Not Asked        Weight Concern: Not Asked        Special Diet: Not Asked        Back Care: Not Asked        Exercise: Not Asked        Bike Helmet: Not Asked        Seat Belt: Not Asked        Self-Exams: Not Aske organomegaly or tenderness   Musculoskeletal:  normal muscle strength and tone  Skin:  normal moisture and skin texture  Hair & Nails:  normal scalp hair     Hematologic:  no excessive bruising  Neuro:  sensory grossly intact and motor grossly intact  Psyc

## 2018-11-13 ENCOUNTER — TELEPHONE (OUTPATIENT)
Dept: ENDOCRINOLOGY CLINIC | Facility: CLINIC | Age: 78
End: 2018-11-13

## 2018-11-13 DIAGNOSIS — E03.9 HYPOTHYROIDISM, UNSPECIFIED TYPE: Primary | ICD-10-CM

## 2018-11-13 RX ORDER — LEVOTHYROXINE SODIUM 137 UG/1
137 TABLET ORAL
Qty: 30 TABLET | Refills: 5 | Status: SHIPPED | OUTPATIENT
Start: 2018-11-13 | End: 2019-11-08

## 2018-11-13 NOTE — TELEPHONE ENCOUNTER
Please call patient - his thyroid levels have gotten significantly worst on recent blood work. Has he been taking his thyroid medication? If yes then please increase dose to 137mcg PO daily #30, refill 6 ad repeat TSH, FT4 in 2 months.

## 2018-11-13 NOTE — TELEPHONE ENCOUNTER
Spoke with patient and wife with his permission. She confirmed he is taking medication every day. Understands to increase dose to 137mcg. Sent prescription and placed orders to be repeated in 2 months.

## 2018-12-03 ENCOUNTER — PRIOR ORIGINAL RECORDS (OUTPATIENT)
Dept: OTHER | Age: 78
End: 2018-12-03

## 2019-01-14 LAB
BUN: 14 MG/DL
CALCIUM: 8.7 MG/DL
CHLORIDE: 111 MEQ/L
CHOLESTEROL, TOTAL: 140 MG/DL
CREATININE, SERUM: 1.19 MG/DL
GLUCOSE: 137 MG/DL
GLUCOSE: 137 MG/DL
HDL CHOLESTEROL: 42 MG/DL
HEMOGLOBIN A1C: 6.3 %
LDL CHOLESTEROL: 64 MG/DL
NON-HDL CHOLESTEROL: 98 MG/DL
POTASSIUM, SERUM: 3.8 MEQ/L
SODIUM: 140 MEQ/L
THYROID STIMULATING HORMONE: 25.4 MLU/L
TRIGLYCERIDES: 169 MG/DL

## 2019-01-16 ENCOUNTER — PRIOR ORIGINAL RECORDS (OUTPATIENT)
Dept: OTHER | Age: 79
End: 2019-01-16

## 2019-01-16 ENCOUNTER — APPOINTMENT (OUTPATIENT)
Dept: LAB | Facility: HOSPITAL | Age: 79
End: 2019-01-16
Attending: INTERNAL MEDICINE
Payer: COMMERCIAL

## 2019-01-16 ENCOUNTER — MYAURORA ACCOUNT LINK (OUTPATIENT)
Dept: OTHER | Age: 79
End: 2019-01-16

## 2019-01-16 DIAGNOSIS — E03.9 HYPOTHYROIDISM, UNSPECIFIED TYPE: ICD-10-CM

## 2019-01-16 LAB
T4 FREE SERPL-MCNC: 0.98 NG/DL (ref 0.58–1.64)
TSH SERPL-ACNC: 2.19 UIU/ML (ref 0.45–5.33)

## 2019-01-16 PROCEDURE — 36415 COLL VENOUS BLD VENIPUNCTURE: CPT

## 2019-01-16 PROCEDURE — 84439 ASSAY OF FREE THYROXINE: CPT

## 2019-01-16 PROCEDURE — 84443 ASSAY THYROID STIM HORMONE: CPT

## 2019-01-21 ENCOUNTER — PRIOR ORIGINAL RECORDS (OUTPATIENT)
Dept: OTHER | Age: 79
End: 2019-01-21

## 2019-02-04 ENCOUNTER — OFFICE VISIT (OUTPATIENT)
Dept: ENDOCRINOLOGY CLINIC | Facility: CLINIC | Age: 79
End: 2019-02-04
Payer: COMMERCIAL

## 2019-02-04 VITALS
SYSTOLIC BLOOD PRESSURE: 147 MMHG | WEIGHT: 244 LBS | BODY MASS INDEX: 33 KG/M2 | DIASTOLIC BLOOD PRESSURE: 70 MMHG | HEART RATE: 68 BPM

## 2019-02-04 DIAGNOSIS — E11.9 CONTROLLED TYPE 2 DIABETES MELLITUS WITHOUT COMPLICATION, WITHOUT LONG-TERM CURRENT USE OF INSULIN (HCC): Primary | ICD-10-CM

## 2019-02-04 LAB
CARTRIDGE LOT#: ABNORMAL NUMERIC
GLUCOSE BLOOD: 122
HEMOGLOBIN A1C: 6.7 % (ref 4.3–5.6)
TEST STRIP LOT #: NORMAL NUMERIC

## 2019-02-04 PROCEDURE — 83036 HEMOGLOBIN GLYCOSYLATED A1C: CPT | Performed by: INTERNAL MEDICINE

## 2019-02-04 PROCEDURE — 36416 COLLJ CAPILLARY BLOOD SPEC: CPT | Performed by: INTERNAL MEDICINE

## 2019-02-04 PROCEDURE — 99212 OFFICE O/P EST SF 10 MIN: CPT | Performed by: INTERNAL MEDICINE

## 2019-02-04 PROCEDURE — 99213 OFFICE O/P EST LOW 20 MIN: CPT | Performed by: INTERNAL MEDICINE

## 2019-02-04 PROCEDURE — 82962 GLUCOSE BLOOD TEST: CPT | Performed by: INTERNAL MEDICINE

## 2019-02-04 NOTE — PROGRESS NOTES
Name: Cat Ray  Date: 2/4/2019    Referring Physician: No ref. provider found    HISTORY OF PRESENT ILLNESS   Cat Ray is a [de-identified]year old male who presents for diabetes mellitus, diagnosed over 10 years ago.      He developed significant abdominal pain ap directed every 14 (fourteen) days. , Disp: , Rfl:   •  Rosuvastatin Calcium 5 MG Oral Tab, Take 5 mg by mouth nightly., Disp: , Rfl:   •  Alfuzosin HCl ER 10 MG Oral Tablet 24 Hr, Take 1 tablet (10 mg total) by mouth daily. , Disp: 1 tablet, Rfl: 0  •  ergoc atherosclerosis 7/20/2018   • Coronary atherosclerosis    • Diabetes ([facility]    • Diabetes mellitus ([facility]    • Disorder of thyroid    • Hearing difficulty of both ears    • High blood pressure    • High cholesterol    • History of shoulder surgery     right disease  -Discussed importance of SBGM  -Discussed importance of low CHO diet, recommend 45gm per meal or 135gm per day  -Provided patient education materials  -Continue Jardiance 25mg PO daily, verbalized understanding of risks and benefits  -BG level con

## 2019-02-05 ENCOUNTER — LAB REQUISITION (OUTPATIENT)
Dept: LAB | Facility: HOSPITAL | Age: 79
End: 2019-02-05
Payer: COMMERCIAL

## 2019-02-05 DIAGNOSIS — Z00.01 ENCOUNTER FOR GENERAL ADULT MEDICAL EXAMINATION WITH ABNORMAL FINDINGS: ICD-10-CM

## 2019-02-05 LAB
ALBUMIN SERPL BCP-MCNC: 3.5 G/DL (ref 3.5–4.8)
ALBUMIN/GLOB SERPL: 1.1 {RATIO} (ref 1–2)
ALP SERPL-CCNC: 130 U/L (ref 32–100)
ALT SERPL-CCNC: 21 U/L (ref 17–63)
ANION GAP SERPL CALC-SCNC: 10 MMOL/L (ref 0–18)
AST SERPL-CCNC: 27 U/L (ref 15–41)
BACTERIA UR QL AUTO: NEGATIVE /HPF
BASOPHILS # BLD AUTO: 0.04 X10(3) UL (ref 0–0.2)
BASOPHILS NFR BLD AUTO: 0.7 %
BILIRUB SERPL-MCNC: 0.7 MG/DL (ref 0.3–1.2)
BILIRUB UR QL: NEGATIVE
BUN SERPL-MCNC: 17 MG/DL (ref 8–20)
BUN/CREAT SERPL: 13.8 (ref 10–20)
CALCIUM SERPL-MCNC: 8.8 MG/DL (ref 8.5–10.5)
CHLORIDE SERPL-SCNC: 110 MMOL/L (ref 95–110)
CHOLEST SERPL-MCNC: 142 MG/DL (ref 110–200)
CLARITY UR: CLEAR
CO2 SERPL-SCNC: 21 MMOL/L (ref 22–32)
COLOR UR: YELLOW
CREAT SERPL-MCNC: 1.23 MG/DL (ref 0.5–1.5)
CREAT UR-MCNC: 78.9 MG/DL
DEPRECATED RDW RBC AUTO: 49.3 FL (ref 35.1–46.3)
EOSINOPHIL # BLD AUTO: 0.29 X10(3) UL (ref 0–0.7)
EOSINOPHIL NFR BLD AUTO: 4.9 %
ERYTHROCYTE [DISTWIDTH] IN BLOOD BY AUTOMATED COUNT: 13.8 % (ref 11–15)
EST. AVERAGE GLUCOSE BLD GHB EST-MCNC: 140 MG/DL (ref 68–126)
GLOBULIN PLAS-MCNC: 3.3 G/DL (ref 2.5–3.7)
GLUCOSE SERPL-MCNC: 123 MG/DL (ref 70–99)
GLUCOSE UR-MCNC: >=500 MG/DL
HBA1C MFR BLD HPLC: 6.5 % (ref ?–5.7)
HCT VFR BLD AUTO: 37.2 % (ref 39–53)
HDLC SERPL-MCNC: 34 MG/DL
HGB BLD-MCNC: 12.3 G/DL (ref 13–17.5)
HGB UR QL STRIP.AUTO: NEGATIVE
IMM GRANULOCYTES # BLD AUTO: 0.01 X10(3) UL (ref 0–1)
IMM GRANULOCYTES NFR BLD: 0.2 %
KETONES UR-MCNC: NEGATIVE MG/DL
LDLC SERPL CALC-MCNC: 45 MG/DL (ref 0–99)
LEUKOCYTE ESTERASE UR QL STRIP.AUTO: NEGATIVE
LYMPHOCYTES # BLD AUTO: 1.77 X10(3) UL (ref 1–4)
LYMPHOCYTES NFR BLD AUTO: 29.8 %
MCH RBC QN AUTO: 32.4 PG (ref 26–34)
MCHC RBC AUTO-ENTMCNC: 33.1 G/DL (ref 31–37)
MCV RBC AUTO: 97.9 FL (ref 80–100)
MICROALBUMIN UR-MCNC: 1.8 MG/DL (ref 0–1.8)
MICROALBUMIN/CREAT UR: 22.8 MG/G{CREAT} (ref 0–20)
MONOCYTES # BLD AUTO: 0.45 X10(3) UL (ref 0.1–1)
MONOCYTES NFR BLD AUTO: 7.6 %
NEUTROPHILS # BLD AUTO: 3.38 X10 (3) UL (ref 1.5–7.7)
NEUTROPHILS # BLD AUTO: 3.38 X10(3) UL (ref 1.5–7.7)
NEUTROPHILS NFR BLD AUTO: 56.8 %
NITRITE UR QL STRIP.AUTO: NEGATIVE
NONHDLC SERPL-MCNC: 108 MG/DL
OSMOLALITY UR CALC.SUM OF ELEC: 295 MOSM/KG (ref 275–295)
PH UR: 5 [PH] (ref 5–8)
PLATELET # BLD AUTO: 121 10(3)UL (ref 150–450)
POTASSIUM SERPL-SCNC: 3.8 MMOL/L (ref 3.3–5.1)
PROT SERPL-MCNC: 6.8 G/DL (ref 5.9–8.4)
PROT UR-MCNC: NEGATIVE MG/DL
RBC # BLD AUTO: 3.8 X10(6)UL (ref 3.8–5.8)
RBC #/AREA URNS AUTO: 1 /HPF
SODIUM SERPL-SCNC: 141 MMOL/L (ref 136–144)
SP GR UR STRIP: 1.02 (ref 1–1.03)
TRIGL SERPL-MCNC: 316 MG/DL (ref 1–149)
TSH SERPL-ACNC: 7.92 UIU/ML (ref 0.45–5.33)
UROBILINOGEN UR STRIP-ACNC: <2
VIT C UR-MCNC: 40 MG/DL
WBC # BLD AUTO: 5.9 X10(3) UL (ref 4–11)
WBC #/AREA URNS AUTO: 2 /HPF

## 2019-02-05 PROCEDURE — 80061 LIPID PANEL: CPT | Performed by: INTERNAL MEDICINE

## 2019-02-05 PROCEDURE — 84443 ASSAY THYROID STIM HORMONE: CPT | Performed by: INTERNAL MEDICINE

## 2019-02-05 PROCEDURE — 82570 ASSAY OF URINE CREATININE: CPT | Performed by: INTERNAL MEDICINE

## 2019-02-05 PROCEDURE — 81001 URINALYSIS AUTO W/SCOPE: CPT | Performed by: INTERNAL MEDICINE

## 2019-02-05 PROCEDURE — 85025 COMPLETE CBC W/AUTO DIFF WBC: CPT | Performed by: INTERNAL MEDICINE

## 2019-02-05 PROCEDURE — 80053 COMPREHEN METABOLIC PANEL: CPT | Performed by: INTERNAL MEDICINE

## 2019-02-05 PROCEDURE — 82043 UR ALBUMIN QUANTITATIVE: CPT | Performed by: INTERNAL MEDICINE

## 2019-02-05 PROCEDURE — 83036 HEMOGLOBIN GLYCOSYLATED A1C: CPT | Performed by: INTERNAL MEDICINE

## 2019-02-11 ENCOUNTER — OFFICE VISIT (OUTPATIENT)
Dept: NEUROLOGY | Facility: CLINIC | Age: 79
End: 2019-02-11
Payer: COMMERCIAL

## 2019-02-11 VITALS
SYSTOLIC BLOOD PRESSURE: 148 MMHG | HEART RATE: 72 BPM | HEIGHT: 71 IN | BODY MASS INDEX: 34.16 KG/M2 | DIASTOLIC BLOOD PRESSURE: 74 MMHG | WEIGHT: 244 LBS | RESPIRATION RATE: 18 BRPM

## 2019-02-11 DIAGNOSIS — H91.92 HEARING DECREASED, LEFT: ICD-10-CM

## 2019-02-11 DIAGNOSIS — R26.89 BALANCE PROBLEMS: Primary | ICD-10-CM

## 2019-02-11 PROCEDURE — 99204 OFFICE O/P NEW MOD 45 MIN: CPT | Performed by: PHYSICAL MEDICINE & REHABILITATION

## 2019-02-11 NOTE — PATIENT INSTRUCTIONS
-Start physical therapy  -Continue walking with walker for increased support  -Take your time when getting out of bed and when bending forward  -Follow up with me 4-6 weeks

## 2019-02-11 NOTE — PROGRESS NOTES
[address]  NEW PATIENT EVALUATION    Consultation as a request of Dr. Yenifer Tavares     Chief Complaint: Balance problems     HISTORY OF PRESENT ILLNESS:   Patient presents with:   Other: new patient c/o balance is having any treatment for this problem.       Work Related: No  Occupation:retired   Activity level: Likes to stay active, exercise his arms and legs       PAST MEDICAL HISTORY:     Past Medical History:   Diagnosis Date   • Aortic atherosclerosis (HC 2 (two) times daily. Disp:  Rfl:    finasteride 5 MG Oral Tab Take 5 mg by mouth daily. Disp:  Rfl:    Vitamin C (VITAMIN C) 500 MG Oral Tab Take 500 mg by mouth daily. Disp:  Rfl:    Rosuvastatin Calcium 5 MG Oral Tab Take 5 mg by mouth nightly.  Disp: No immediate distress  Head: Normocephalic/ Atraumatic  Eyes: Extra-occular movements intact. Ears: No auricular hematoma or deformities  Mouth: No lesions or ulcerations  Heart: peripheral pulses intact. Normal capillary refill.    Lungs: Non-labored res OSMOCALC 295 02/05/2019    ALKPHO 130 (H) 02/05/2019    AST 27 02/05/2019    ALT 21 02/05/2019    BILT 0.7 02/05/2019    TP 6.8 02/05/2019    ALB 3.5 02/05/2019    GLOBULIN 3.3 02/05/2019     02/05/2019    K 3.8 02/05/2019     02/05/2019 problem persists for further workup. The total length of the visit was 45 minutes and greater than 50% was spent face to face with the patient and their caregivers counseling and/or coordinating care.     David Baptiste DO, FABPMR & CAQSM  Physical Medic

## 2019-02-20 ENCOUNTER — APPOINTMENT (OUTPATIENT)
Dept: PHYSICAL THERAPY | Facility: HOSPITAL | Age: 79
End: 2019-02-20
Attending: PHYSICAL MEDICINE & REHABILITATION
Payer: COMMERCIAL

## 2019-02-22 ENCOUNTER — APPOINTMENT (OUTPATIENT)
Dept: PHYSICAL THERAPY | Facility: HOSPITAL | Age: 79
End: 2019-02-22
Attending: PHYSICAL MEDICINE & REHABILITATION
Payer: COMMERCIAL

## 2019-02-26 ENCOUNTER — OFFICE VISIT (OUTPATIENT)
Dept: PHYSICAL THERAPY | Facility: HOSPITAL | Age: 79
End: 2019-02-26
Attending: PHYSICAL MEDICINE & REHABILITATION
Payer: COMMERCIAL

## 2019-02-26 DIAGNOSIS — H91.92 HEARING DECREASED, LEFT: ICD-10-CM

## 2019-02-26 DIAGNOSIS — R26.89 BALANCE PROBLEMS: ICD-10-CM

## 2019-02-26 PROCEDURE — 97112 NEUROMUSCULAR REEDUCATION: CPT

## 2019-02-26 PROCEDURE — 97161 PT EVAL LOW COMPLEX 20 MIN: CPT

## 2019-02-26 NOTE — PATIENT INSTRUCTIONS
[employer] Home exercises   Do Every Day    1. Stand up and sit down from a sturdy chair with arms crossed over your chest.  Do slowly, 10 times. 2.  Stand with the countertop behind you for safety. Turn your head and shoulders to look behind you.   Turn ba

## 2019-02-26 NOTE — PROGRESS NOTES
NEUROLOGICAL PHYSICAL THERAPY EVALUATION:   Referring Physician: Dr. Agnieszka Powell  Diagnosis: Gait Instability       Date of Onset: 2/11/19 Date of Service: 2/26/2019  [facility]     PATIENT SUMMARY   Laquita Headings is a [de-identified]year old y/o male who presents t feet.    Coordination Testing:   Finger to Nose: slow speed  Heel-to-Shin: NT  Pronation/Supination: decreased accuracy  Toe Tap: WNL    Tone: WNL both LE's    ROM: No limitations except trunk stiffness for rotation and extension.     Strength:   Strength ( Timed Treatment: 45 min     Total Treatment Time: 50 min        PLAN OF CARE:    Goals to be achieved in the next 90 days:  1. Pt. Able to transfer floor to stand indep. 2.  Improve Talavera Balance scale score to more than 46/56 to decrease fall risk  3.   I

## 2019-02-28 VITALS
SYSTOLIC BLOOD PRESSURE: 140 MMHG | HEART RATE: 59 BPM | RESPIRATION RATE: 18 BRPM | BODY MASS INDEX: 34.13 KG/M2 | WEIGHT: 252 LBS | HEIGHT: 72 IN | DIASTOLIC BLOOD PRESSURE: 60 MMHG

## 2019-02-28 VITALS
DIASTOLIC BLOOD PRESSURE: 64 MMHG | BODY MASS INDEX: 33.05 KG/M2 | HEIGHT: 72 IN | WEIGHT: 244 LBS | RESPIRATION RATE: 18 BRPM | HEART RATE: 72 BPM | SYSTOLIC BLOOD PRESSURE: 136 MMHG

## 2019-02-28 VITALS
BODY MASS INDEX: 33.46 KG/M2 | RESPIRATION RATE: 18 BRPM | HEIGHT: 72 IN | WEIGHT: 247 LBS | SYSTOLIC BLOOD PRESSURE: 130 MMHG | HEART RATE: 63 BPM | DIASTOLIC BLOOD PRESSURE: 54 MMHG

## 2019-02-28 VITALS
RESPIRATION RATE: 18 BRPM | DIASTOLIC BLOOD PRESSURE: 70 MMHG | SYSTOLIC BLOOD PRESSURE: 136 MMHG | WEIGHT: 257 LBS | HEIGHT: 72 IN | HEART RATE: 77 BPM | BODY MASS INDEX: 34.81 KG/M2

## 2019-03-01 VITALS
SYSTOLIC BLOOD PRESSURE: 146 MMHG | HEIGHT: 72 IN | HEART RATE: 82 BPM | DIASTOLIC BLOOD PRESSURE: 66 MMHG | BODY MASS INDEX: 34.13 KG/M2 | WEIGHT: 252 LBS | RESPIRATION RATE: 18 BRPM

## 2019-03-05 ENCOUNTER — TELEPHONE (OUTPATIENT)
Dept: PHYSICAL THERAPY | Facility: HOSPITAL | Age: 79
End: 2019-03-05

## 2019-03-05 ENCOUNTER — APPOINTMENT (OUTPATIENT)
Dept: PHYSICAL THERAPY | Facility: HOSPITAL | Age: 79
End: 2019-03-05
Attending: PHYSICAL MEDICINE & REHABILITATION
Payer: COMMERCIAL

## 2019-03-07 ENCOUNTER — APPOINTMENT (OUTPATIENT)
Dept: PHYSICAL THERAPY | Facility: HOSPITAL | Age: 79
End: 2019-03-07
Attending: PHYSICAL MEDICINE & REHABILITATION
Payer: COMMERCIAL

## 2019-03-12 ENCOUNTER — APPOINTMENT (OUTPATIENT)
Dept: PHYSICAL THERAPY | Facility: HOSPITAL | Age: 79
End: 2019-03-12
Attending: PHYSICAL MEDICINE & REHABILITATION
Payer: COMMERCIAL

## 2019-03-14 ENCOUNTER — APPOINTMENT (OUTPATIENT)
Dept: PHYSICAL THERAPY | Facility: HOSPITAL | Age: 79
End: 2019-03-14
Attending: PHYSICAL MEDICINE & REHABILITATION
Payer: COMMERCIAL

## 2019-03-19 ENCOUNTER — APPOINTMENT (OUTPATIENT)
Dept: PHYSICAL THERAPY | Facility: HOSPITAL | Age: 79
End: 2019-03-19
Attending: PHYSICAL MEDICINE & REHABILITATION
Payer: COMMERCIAL

## 2019-03-21 ENCOUNTER — OFFICE VISIT (OUTPATIENT)
Dept: PHYSICAL THERAPY | Facility: HOSPITAL | Age: 79
End: 2019-03-21
Attending: PHYSICAL MEDICINE & REHABILITATION
Payer: COMMERCIAL

## 2019-03-21 PROCEDURE — 97112 NEUROMUSCULAR REEDUCATION: CPT

## 2019-03-21 PROCEDURE — 97530 THERAPEUTIC ACTIVITIES: CPT

## 2019-03-21 NOTE — PROGRESS NOTES
Date  3/21/19          Visit Number  2/10          NMR x          Ther EX           Ther Act x          Gait Training x          CRM            Manual             Dx: Gait Instability         Insurance:  Medicare    Referring MD: Mario Gamez   Precautions: Fall

## 2019-03-21 NOTE — PATIENT INSTRUCTIONS
[employer] Home exercises   Do Every Day-  SLOW    1. Stand up and sit down from a sturdy chair with arms crossed over your chest.  Do slowly, 10 times. 2.  Stand with the countertop behind you for safety. Stand with your feet closer together.   Turn your h

## 2019-04-08 RX ORDER — FINASTERIDE 5 MG/1
TABLET, FILM COATED ORAL
COMMUNITY

## 2019-04-08 RX ORDER — ALFUZOSIN HYDROCHLORIDE 10 MG/1
TABLET, EXTENDED RELEASE ORAL
COMMUNITY

## 2019-04-08 RX ORDER — AMLODIPINE BESYLATE 2.5 MG/1
TABLET ORAL
COMMUNITY
Start: 2018-07-25

## 2019-04-08 RX ORDER — ROSUVASTATIN CALCIUM 5 MG/1
TABLET, COATED ORAL
COMMUNITY
End: 2019-04-21 | Stop reason: SDUPTHER

## 2019-04-08 RX ORDER — ASPIRIN 325 MG
TABLET ORAL
COMMUNITY

## 2019-04-08 RX ORDER — ERGOCALCIFEROL 1.25 MG/1
CAPSULE ORAL
COMMUNITY

## 2019-04-16 ENCOUNTER — OFFICE VISIT (OUTPATIENT)
Dept: PHYSICAL THERAPY | Facility: HOSPITAL | Age: 79
End: 2019-04-16
Attending: PHYSICAL MEDICINE & REHABILITATION
Payer: COMMERCIAL

## 2019-04-16 PROCEDURE — 97112 NEUROMUSCULAR REEDUCATION: CPT

## 2019-04-16 PROCEDURE — 97530 THERAPEUTIC ACTIVITIES: CPT

## 2019-04-16 NOTE — PROGRESS NOTES
Date  3/21/19 4/16/19         Visit Number  2/10 3/10         NMR x x         Ther EX           Ther Act x x         Gait Training x x         CRM            Manual             Dx: Gait Instability         Insurance:  Medicare    Referring MD: Ananya Anderson

## 2019-04-18 ENCOUNTER — OFFICE VISIT (OUTPATIENT)
Dept: PHYSICAL THERAPY | Facility: HOSPITAL | Age: 79
End: 2019-04-18
Attending: PHYSICAL MEDICINE & REHABILITATION
Payer: COMMERCIAL

## 2019-04-18 PROCEDURE — 97530 THERAPEUTIC ACTIVITIES: CPT

## 2019-04-18 PROCEDURE — 97112 NEUROMUSCULAR REEDUCATION: CPT

## 2019-04-18 NOTE — PROGRESS NOTES
Date  3/21/19 4/16/19 4/18/19        Visit Number  2/10 3/10 4/10        NMR x x x        Ther EX           Ther Act x x x        Gait Training x x x        CRM            Manual             Dx: Gait Instability         Insurance:  Medicare    Referring MD

## 2019-04-22 RX ORDER — ROSUVASTATIN CALCIUM 5 MG/1
TABLET, COATED ORAL
Qty: 90 TABLET | Refills: 3 | Status: SHIPPED | OUTPATIENT
Start: 2019-04-22

## 2019-04-23 ENCOUNTER — LAB ENCOUNTER (OUTPATIENT)
Dept: LAB | Facility: HOSPITAL | Age: 79
End: 2019-04-23
Attending: INTERNAL MEDICINE
Payer: COMMERCIAL

## 2019-04-23 DIAGNOSIS — D51.0 PERNICIOUS ANEMIA: Primary | ICD-10-CM

## 2019-04-23 PROCEDURE — 36415 COLL VENOUS BLD VENIPUNCTURE: CPT

## 2019-04-23 PROCEDURE — 82607 VITAMIN B-12: CPT

## 2019-04-25 ENCOUNTER — OFFICE VISIT (OUTPATIENT)
Dept: PHYSICAL THERAPY | Facility: HOSPITAL | Age: 79
End: 2019-04-25
Attending: PHYSICAL MEDICINE & REHABILITATION
Payer: COMMERCIAL

## 2019-04-25 PROCEDURE — 97112 NEUROMUSCULAR REEDUCATION: CPT

## 2019-04-25 PROCEDURE — 97530 THERAPEUTIC ACTIVITIES: CPT

## 2019-04-25 NOTE — PROGRESS NOTES
Date  3/21/19 4/16/19 4/18/19 4/25/19       Visit Number  2/10 3/10 4/10 5/10       NMR x x x x       Ther EX           Ther Act x x x x       Gait Training x x x x       CRM            Manual             Dx: Gait Instability         Insurance:  Medicare

## 2019-05-02 ENCOUNTER — OFFICE VISIT (OUTPATIENT)
Dept: PHYSICAL THERAPY | Facility: HOSPITAL | Age: 79
End: 2019-05-02
Attending: PHYSICAL MEDICINE & REHABILITATION
Payer: COMMERCIAL

## 2019-05-02 PROCEDURE — 97112 NEUROMUSCULAR REEDUCATION: CPT

## 2019-05-02 PROCEDURE — 97530 THERAPEUTIC ACTIVITIES: CPT

## 2019-05-02 NOTE — PROGRESS NOTES
Date  3/21/19 4/16/19 4/18/19 4/25/19 5/2/19      Visit Number  2/10 3/10 4/10 5/10 6/10      NMR x x x x x      Ther EX           Ther Act x x x x x      Gait Training x x x x x      CRM            Manual             Dx: Gait Instability         Insurance

## 2019-05-07 ENCOUNTER — OFFICE VISIT (OUTPATIENT)
Dept: PHYSICAL THERAPY | Facility: HOSPITAL | Age: 79
End: 2019-05-07
Attending: INTERNAL MEDICINE
Payer: COMMERCIAL

## 2019-05-07 PROCEDURE — 97112 NEUROMUSCULAR REEDUCATION: CPT

## 2019-05-07 PROCEDURE — 97530 THERAPEUTIC ACTIVITIES: CPT

## 2019-05-07 NOTE — PROGRESS NOTES
Date  3/21/19 4/16/19 4/18/19 4/25/19 5/2/19 5/7/19     Visit Number  2/10 3/10 4/10 5/10 6/10 7/10     NMR x x x x x x     Ther EX           Ther Act x x x x x x     Gait Training x x x x x x     CRM            Manual             Dx: Gait Instability

## 2019-05-09 ENCOUNTER — APPOINTMENT (OUTPATIENT)
Dept: PHYSICAL THERAPY | Facility: HOSPITAL | Age: 79
End: 2019-05-09
Attending: INTERNAL MEDICINE
Payer: COMMERCIAL

## 2019-05-14 ENCOUNTER — APPOINTMENT (OUTPATIENT)
Dept: PHYSICAL THERAPY | Facility: HOSPITAL | Age: 79
End: 2019-05-14
Attending: INTERNAL MEDICINE
Payer: COMMERCIAL

## 2019-05-29 ENCOUNTER — TELEPHONE (OUTPATIENT)
Dept: CARDIOLOGY | Age: 79
End: 2019-05-29

## 2019-06-12 ENCOUNTER — HOSPITAL ENCOUNTER (OUTPATIENT)
Dept: GENERAL RADIOLOGY | Facility: HOSPITAL | Age: 79
Discharge: HOME | End: 2019-06-12
Attending: INTERNAL MEDICINE
Payer: COMMERCIAL

## 2019-06-12 DIAGNOSIS — M79.675 PAIN OF LEFT GREAT TOE: ICD-10-CM

## 2019-06-12 DIAGNOSIS — M79.671 ARCH PAIN, RIGHT: ICD-10-CM

## 2019-06-12 PROCEDURE — 73630 X-RAY EXAM OF FOOT: CPT | Performed by: INTERNAL MEDICINE

## 2019-07-03 LAB
ALBUMIN SERPL-MCNC: 3.1 G/DL
ALBUMIN/GLOB SERPL: NORMAL {RATIO}
ALP SERPL-CCNC: 124 U/L
ALT SERPL-CCNC: 21 U/L
ANION GAP SERPL CALC-SCNC: NORMAL MMOL/L
AST SERPL-CCNC: 21 U/L
BILIRUB SERPL-MCNC: 0.7 MG/DL
BUN SERPL-MCNC: 22 MG/DL
BUN/CREAT SERPL: NORMAL
CALCIUM SERPL-MCNC: 8.1 MG/DL
CHLORIDE SERPL-SCNC: 113 MMOL/L
CO2 SERPL-SCNC: NORMAL MMOL/L
CREAT SERPL-MCNC: 1.53 MG/DL
GLOBULIN SER-MCNC: 3.8 G/DL
GLUCOSE SERPL-MCNC: 122 MG/DL
LENGTH OF FAST TIME PATIENT: NORMAL H
POTASSIUM SERPL-SCNC: 4.2 MMOL/L
PROT SERPL-MCNC: 6.9 G/DL
SODIUM SERPL-SCNC: 142 MMOL/L

## 2019-07-08 PROBLEM — I25.10 CAD, MULTIPLE VESSEL: Status: ACTIVE | Noted: 2019-07-08

## 2019-07-08 PROBLEM — Z95.1 HX OF CABG: Status: ACTIVE | Noted: 2019-07-08

## 2019-07-08 PROBLEM — I10 HYPERTENSION: Status: ACTIVE | Noted: 2019-07-08

## 2019-07-08 PROBLEM — E78.00 PURE HYPERCHOLESTEROLEMIA: Status: ACTIVE | Noted: 2019-07-08

## 2019-07-08 PROBLEM — E11.9 DM (DIABETES MELLITUS), TYPE 2 (CMD): Status: ACTIVE | Noted: 2019-07-08

## 2019-07-15 ENCOUNTER — OFFICE VISIT (OUTPATIENT)
Dept: CARDIOLOGY | Age: 79
End: 2019-07-15

## 2019-07-15 VITALS
SYSTOLIC BLOOD PRESSURE: 120 MMHG | HEART RATE: 61 BPM | BODY MASS INDEX: 33.88 KG/M2 | WEIGHT: 242 LBS | HEIGHT: 71 IN | OXYGEN SATURATION: 95 % | DIASTOLIC BLOOD PRESSURE: 60 MMHG

## 2019-07-15 DIAGNOSIS — I25.10 CAD, MULTIPLE VESSEL: Primary | ICD-10-CM

## 2019-07-15 DIAGNOSIS — E78.00 PURE HYPERCHOLESTEROLEMIA: ICD-10-CM

## 2019-07-15 PROCEDURE — 99214 OFFICE O/P EST MOD 30 MIN: CPT | Performed by: INTERNAL MEDICINE

## 2019-07-15 RX ORDER — DIVALPROEX SODIUM 250 MG/1
250 TABLET, EXTENDED RELEASE ORAL 2 TIMES DAILY
COMMUNITY

## 2019-07-15 RX ORDER — CITALOPRAM 20 MG/1
20 TABLET ORAL DAILY
COMMUNITY

## 2019-07-15 RX ORDER — METOPROLOL SUCCINATE 25 MG/1
25 TABLET, EXTENDED RELEASE ORAL 2 TIMES DAILY
COMMUNITY
Start: 2017-06-09 | End: 2019-07-15

## 2019-07-15 RX ORDER — ASCORBIC ACID 500 MG
500 TABLET ORAL DAILY
COMMUNITY

## 2019-07-15 RX ORDER — MULTIVITAMIN
1 TABLET ORAL DAILY
COMMUNITY
End: 2019-10-30

## 2019-07-15 RX ORDER — LEVOTHYROXINE SODIUM 13 UG/1
150 CAPSULE ORAL DAILY
COMMUNITY
Start: 2018-01-17

## 2019-07-15 RX ORDER — CYANOCOBALAMIN 1000 UG/ML
1000 INJECTION, SOLUTION INTRAMUSCULAR; SUBCUTANEOUS SEE ADMIN INSTRUCTIONS
COMMUNITY

## 2019-07-15 RX ORDER — RISPERIDONE 1 MG/1
1 TABLET ORAL NIGHTLY
COMMUNITY

## 2019-07-15 SDOH — HEALTH STABILITY: MENTAL HEALTH: HOW OFTEN DO YOU HAVE A DRINK CONTAINING ALCOHOL?: NEVER

## 2019-07-15 ASSESSMENT — PATIENT HEALTH QUESTIONNAIRE - PHQ9
1. LITTLE INTEREST OR PLEASURE IN DOING THINGS: NOT AT ALL
SUM OF ALL RESPONSES TO PHQ9 QUESTIONS 1 AND 2: 0
2. FEELING DOWN, DEPRESSED OR HOPELESS: NOT AT ALL
SUM OF ALL RESPONSES TO PHQ9 QUESTIONS 1 AND 2: 0

## 2019-07-17 ENCOUNTER — LAB REQUISITION (OUTPATIENT)
Dept: LAB | Facility: HOSPITAL | Age: 79
End: 2019-07-17
Payer: COMMERCIAL

## 2019-07-17 DIAGNOSIS — M10.9 GOUT: ICD-10-CM

## 2019-07-17 LAB — URATE SERPL-MCNC: 5.2 MG/DL (ref 3.5–7.2)

## 2019-07-17 PROCEDURE — 84550 ASSAY OF BLOOD/URIC ACID: CPT | Performed by: INTERNAL MEDICINE

## 2019-09-09 RX ORDER — EMPAGLIFLOZIN 25 MG/1
TABLET, FILM COATED ORAL
Qty: 30 TABLET | Refills: 5 | Status: SHIPPED | OUTPATIENT
Start: 2019-09-09 | End: 2019-09-13

## 2019-09-12 ENCOUNTER — TELEPHONE (OUTPATIENT)
Dept: ENDOCRINOLOGY CLINIC | Facility: CLINIC | Age: 79
End: 2019-09-12

## 2019-09-12 NOTE — TELEPHONE ENCOUNTER
Current Outpatient Medications:  JARDIANCE 25 MG Oral Tab TAKE 1 TABLET BY MOUTH DAILY Disp: 30 tablet Rfl: 5     PA has been started go to key. [employer] enter key [id number], pts last name,   To initiate authorization pls contact [facility]

## 2019-10-30 ENCOUNTER — OFFICE VISIT (OUTPATIENT)
Dept: CARDIOLOGY | Age: 79
End: 2019-10-30

## 2019-10-30 VITALS
HEART RATE: 57 BPM | SYSTOLIC BLOOD PRESSURE: 134 MMHG | BODY MASS INDEX: 32.64 KG/M2 | HEIGHT: 72 IN | WEIGHT: 241 LBS | OXYGEN SATURATION: 96 % | RESPIRATION RATE: 18 BRPM | DIASTOLIC BLOOD PRESSURE: 64 MMHG

## 2019-10-30 DIAGNOSIS — I25.10 CAD, MULTIPLE VESSEL: Primary | ICD-10-CM

## 2019-10-30 DIAGNOSIS — E78.00 PURE HYPERCHOLESTEROLEMIA: ICD-10-CM

## 2019-10-30 PROCEDURE — 99214 OFFICE O/P EST MOD 30 MIN: CPT | Performed by: INTERNAL MEDICINE

## 2019-10-30 RX ORDER — FERROUS SULFATE 325(65) MG
65 TABLET ORAL DAILY
COMMUNITY

## 2019-10-30 SDOH — HEALTH STABILITY: MENTAL HEALTH: HOW OFTEN DO YOU HAVE A DRINK CONTAINING ALCOHOL?: NEVER

## 2019-10-30 ASSESSMENT — PATIENT HEALTH QUESTIONNAIRE - PHQ9
SUM OF ALL RESPONSES TO PHQ9 QUESTIONS 1 AND 2: 0
2. FEELING DOWN, DEPRESSED OR HOPELESS: NOT AT ALL
SUM OF ALL RESPONSES TO PHQ9 QUESTIONS 1 AND 2: 0
1. LITTLE INTEREST OR PLEASURE IN DOING THINGS: NOT AT ALL

## 2019-11-08 RX ORDER — LEVOTHYROXINE SODIUM 137 UG/1
TABLET ORAL
Qty: 30 TABLET | Refills: 0 | Status: SHIPPED | OUTPATIENT
Start: 2019-11-08 | End: 2019-12-08

## 2019-12-09 RX ORDER — LEVOTHYROXINE SODIUM 137 UG/1
TABLET ORAL
Qty: 30 TABLET | Refills: 0 | Status: SHIPPED | OUTPATIENT
Start: 2019-12-09 | End: 2020-01-08

## 2020-01-08 RX ORDER — LEVOTHYROXINE SODIUM 137 UG/1
TABLET ORAL
Qty: 30 TABLET | Refills: 0 | Status: SHIPPED | OUTPATIENT
Start: 2020-01-08 | End: 2020-02-06

## 2020-02-06 RX ORDER — LEVOTHYROXINE SODIUM 137 UG/1
TABLET ORAL
Qty: 30 TABLET | Refills: 2 | Status: SHIPPED | OUTPATIENT
Start: 2020-02-06 | End: 2020-03-19

## 2020-02-06 NOTE — TELEPHONE ENCOUNTER
LEVOTHYROXINE SODIUM 137 MCG Oral Tab, TAKE 1 TABLET BY MOUTH BEFORE BREAKFAST, Disp: 30 tablet, Rfl: 0    REFILL

## 2020-02-06 NOTE — TELEPHONE ENCOUNTER
LOV: 02/04/19 RTC 6 months - contacted patient and set up an appointment as below  LR: 01/08/20    Pended 30 days supply w/ 2 refills for review and approval.  Thank you.     Future Appointments   Date Time Provider Yissel Palacios   5/8/2020  7:30 AM Hud

## 2020-03-19 RX ORDER — LEVOTHYROXINE SODIUM 137 UG/1
TABLET ORAL
Qty: 30 TABLET | Refills: 2 | Status: SHIPPED | OUTPATIENT
Start: 2020-03-19 | End: 2020-08-05

## 2020-04-29 ENCOUNTER — TELEPHONE (OUTPATIENT)
Dept: ENDOCRINOLOGY CLINIC | Facility: CLINIC | Age: 80
End: 2020-04-29

## 2020-04-29 NOTE — TELEPHONE ENCOUNTER
Called pt to offer TeleHealth visit for appt on 5/8. Octavia Connelly (wife, verified) stated Yoan Jones is in [place] and that a phone call would not be beneficial as he cannot hear. Octavia Connelly asked to cancel the appt.

## 2020-07-07 ENCOUNTER — TELEPHONE (OUTPATIENT)
Dept: CARDIOLOGY | Age: 80
End: 2020-07-07

## 2020-07-08 ENCOUNTER — APPOINTMENT (OUTPATIENT)
Dept: CARDIOLOGY | Age: 80
End: 2020-07-08

## 2020-08-04 NOTE — TELEPHONE ENCOUNTER
LOV: 2/4/19   RTC in 6 mo. No future appointments. Spoke with patient's wife, states Brianna Laguerre remains in [facility] ([facility]) and believes border might be closed.   Patient will be traveling to [place] ([facility]), and is asking if Dr. Ana Espinal can refill a 3 month supply meanwhile thin

## 2020-08-05 RX ORDER — LEVOTHYROXINE SODIUM 137 UG/1
TABLET ORAL
Qty: 30 TABLET | Refills: 2 | Status: SHIPPED | OUTPATIENT
Start: 2020-08-05 | End: 2020-11-02

## 2020-11-02 RX ORDER — LEVOTHYROXINE SODIUM 137 UG/1
TABLET ORAL
Qty: 30 TABLET | Refills: 2 | Status: CANCELLED | OUTPATIENT
Start: 2020-11-02

## 2020-11-02 RX ORDER — LEVOTHYROXINE SODIUM 137 UG/1
TABLET ORAL
Qty: 30 TABLET | Refills: 0 | Status: SHIPPED | OUTPATIENT
Start: 2020-11-02

## 2020-11-16 ENCOUNTER — TELEPHONE (OUTPATIENT)
Dept: ENDOCRINOLOGY CLINIC | Facility: CLINIC | Age: 80
End: 2020-11-16

## 2020-11-16 NOTE — TELEPHONE ENCOUNTER
LOV 2/4/19. This medication was refilled on 11/2/20. Patient has no FU. Letter sent on 11/4/20. Tried calling Wife Julio C Fernandes but no answer, unable to leave voicemail. Also tried calling Federico Strong but phone number is file is for a [address]. Please advise.

## 2020-11-16 NOTE — TELEPHONE ENCOUNTER
•  LEVOTHYROXINE SODIUM 137 MCG Oral Tab, TAKE 1 TABLET BY MOUTH BEFORE BREAKFAST, Disp: 30 tablet, Rfl: 0

## 2020-11-16 NOTE — TELEPHONE ENCOUNTER
We sent letter - refuse medication unless patient makes appointment. Please tell pharmacy that patient needs to call our clinic.

## 2021-01-18 RX ORDER — EMPAGLIFLOZIN 25 MG/1
TABLET, FILM COATED ORAL
Qty: 30 TABLET | Refills: 0 | OUTPATIENT
Start: 2021-01-18

## 2021-01-18 RX ORDER — LEVOTHYROXINE SODIUM 137 UG/1
TABLET ORAL
Qty: 30 TABLET | Refills: 0 | OUTPATIENT
Start: 2021-01-18

## 2021-01-18 NOTE — TELEPHONE ENCOUNTER
DO NOT FILL  LOV 2/04/19. No f/u. Multiple attempts have been made to schedule apt. Pt did respond but unable to schedule f/u. Called once more. Home number belongs to a . [address]. Tried Mobile. Sent to voicemail. Left message. Routed to provider.

## 2021-02-03 DIAGNOSIS — Z23 NEED FOR VACCINATION: ICD-10-CM

## (undated) DEVICE — Device: Brand: DEFENDO AIR/WATER/SUCTION AND BIOPSY VALVE

## (undated) DEVICE — FORCEP RADIAL JAW 4

## (undated) DEVICE — ENDOSCOPY PACK - LOWER: Brand: MEDLINE INDUSTRIES, INC.

## (undated) DEVICE — ENDOSCOPY PACK UPPER: Brand: MEDLINE INDUSTRIES, INC.

## (undated) NOTE — ED AVS SNAPSHOT
Hue Vee   MRN: [id number]    Department:  [facility] Emergency Department   Date of Visit:  1/6/2018           Disclosure     Insurance plans vary and the physician(s) referred by the ER may not be covered by your plan.  Please contact your i within the next three months to obtain basic health screening including reassessment of your blood pressure.     IF THERE IS ANY CHANGE OR WORSENING OF YOUR CONDITION, CALL YOUR PRIMARY CARE PHYSICIAN AT ONCE OR RETURN IMMEDIATELY TO THE EMERGENCY DEPARTMEN